# Patient Record
Sex: FEMALE | Race: WHITE | Employment: FULL TIME | RURAL
[De-identification: names, ages, dates, MRNs, and addresses within clinical notes are randomized per-mention and may not be internally consistent; named-entity substitution may affect disease eponyms.]

---

## 2017-03-02 ENCOUNTER — OFFICE VISIT (OUTPATIENT)
Dept: FAMILY MEDICINE CLINIC | Age: 22
End: 2017-03-02

## 2017-03-02 VITALS
HEART RATE: 91 BPM | DIASTOLIC BLOOD PRESSURE: 73 MMHG | BODY MASS INDEX: 22.16 KG/M2 | HEIGHT: 65 IN | TEMPERATURE: 97.6 F | OXYGEN SATURATION: 98 % | WEIGHT: 133 LBS | SYSTOLIC BLOOD PRESSURE: 116 MMHG | RESPIRATION RATE: 16 BRPM

## 2017-03-02 DIAGNOSIS — J30.9 ALLERGIC RHINITIS, CAUSE UNSPECIFIED: Primary | ICD-10-CM

## 2017-03-02 RX ORDER — CETIRIZINE HCL 10 MG
10 TABLET ORAL DAILY
Qty: 30 TAB | Refills: 3 | Status: SHIPPED | OUTPATIENT
Start: 2017-03-02 | End: 2017-09-22 | Stop reason: SDUPTHER

## 2017-03-02 RX ORDER — FLUTICASONE PROPIONATE 50 MCG
2 SPRAY, SUSPENSION (ML) NASAL DAILY
Qty: 1 BOTTLE | Refills: 3 | Status: SHIPPED | OUTPATIENT
Start: 2017-03-02 | End: 2017-10-27 | Stop reason: SDUPTHER

## 2017-03-02 NOTE — MR AVS SNAPSHOT
Visit Information Date & Time Provider Department Dept. Phone Encounter #  
 3/2/2017  3:30 PM Qing Sáurez, [facility] [phone] [id number] Upcoming Health Maintenance Date Due  
 HPV AGE 9Y-34Y (1 of 3 - Female 3 Dose Series) 7/22/2004 PAP AKA CERVICAL CYTOLOGY 7/22/2014 INFLUENZA AGE 9 TO ADULT 8/1/2016 DTaP/Tdap/Td series (7 - Td) 8/5/2023 Allergies as of 3/2/2017  Review Complete On: 3/2/2017 By: Yuliya Tidwell LPN Severity Noted Reaction Type Reactions Ceclor [Cefaclor]  05/05/2010    Other (comments)  
 uticaria Cephalosporins  01/20/2006    Other (comments)  
 swollen joints (as a baby) Current Immunizations  Reviewed on 7/24/2013 Name Date DTaP 8/4/1998, 1/27/1995, 1/24/1994, 1995, 1993 Hep B Vaccine 1/27/1995, 5/31/1994, 4/25/1994 Hib 1/27/1995, 1/24/1994, 1995, 1993 Influenza Vaccine 11/8/2013, 12/7/2012 MMR 8/4/1998, 11/2/1994 Poliovirus vaccine 8/4/1998, 11/2/1994, 1993, 1993 TB Skin Test (PPD) Intradermal 6/28/2016, 6/17/2016, 5/19/2014, 7/24/2013, 7/15/2013 Td 8/16/2004 Tdap 8/5/2013 Not reviewed this visit You Were Diagnosed With   
  
 Codes Comments Allergic rhinitis, cause unspecified    -  Primary ICD-10-CM: J30.9 ICD-9-CM: 477.9 Vitals BP  
  
  
  
  
  
 116/73 (BP 1 Location: Left arm, BP Patient Position: Sitting) Vitals History BMI and BSA Data Body Mass Index Body Surface Area  
 22.13 kg/m 2 1.66 m 2 Preferred Pharmacy Pharmacy Name Phone [facility] [address] [place] [address] [phone] Your Updated Medication List  
  
   
This list is accurate as of: 3/2/17  3:51 PM.  Always use your most recent med list.  
  
  
  
  
 cetirizine 10 mg tablet Commonly known as:  ZYRTEC Take 1 Tab by mouth daily. fluticasone 50 mcg/actuation nasal spray Commonly known as:  Alric Eaves 2 Sprays by Both Nostrils route daily. Prescriptions Sent to Pharmacy Refills  
 cetirizine (ZYRTEC) 10 mg tablet 3 Sig: Take 1 Tab by mouth daily. Class: Normal  
 Pharmacy: [id number] [place] [address] Ph #: [phone] Route: Oral  
 fluticasone (FLONASE) 50 mcg/actuation nasal spray 3 Si Sprays by Both Nostrils route daily. Class: Normal  
 Pharmacy: [id number] [place] [address] Ph #: [phone] Route: Both Nostrils Patient Instructions Sinusitis: Care Instructions Your Care Instructions Sinusitis is an infection of the lining of the sinus cavities in your head. Sinusitis often follows a cold. It causes pain and pressure in your head and face. In most cases, sinusitis gets better on its own in 1 to 2 weeks. But some mild symptoms may last for several weeks. Sometimes antibiotics are needed. Follow-up care is a key part of your treatment and safety. Be sure to make and go to all appointments, and call your doctor if you are having problems. It's also a good idea to know your test results and keep a list of the medicines you take. How can you care for yourself at home? · Take an over-the-counter pain medicine, such as acetaminophen (Tylenol), ibuprofen (Advil, Motrin), or naproxen (Aleve). Read and follow all instructions on the label. · If the doctor prescribed antibiotics, take them as directed. Do not stop taking them just because you feel better. You need to take the full course of antibiotics. · Be careful when taking over-the-counter cold or flu medicines and Tylenol at the same time. Many of these medicines have acetaminophen, which is Tylenol. Read the labels to make sure that you are not taking more than the recommended dose. Too much acetaminophen (Tylenol) can be harmful. · Breathe warm, moist air from a steamy shower, a hot bath, or a sink filled with hot water. Avoid cold, dry air. Using a humidifier in your home may help. Follow the directions for cleaning the machine. · Use saline (saltwater) nasal washes to help keep your nasal passages open and wash out mucus and bacteria. You can buy saline nose drops at a grocery store or drugstore. Or you can make your own at home by adding 1 teaspoon of salt and 1 teaspoon of baking soda to 2 cups of distilled water. If you make your own, fill a bulb syringe with the solution, insert the tip into your nostril, and squeeze gently. Verneice Root your nose. · Put a hot, wet towel or a warm gel pack on your face 3 or 4 times a day for 5 to 10 minutes each time. · Try a decongestant nasal spray like oxymetazoline (Afrin). Do not use it for more than 3 days in a row. Using it for more than 3 days can make your congestion worse. When should you call for help? Call your doctor now or seek immediate medical care if: 
· You have new or worse swelling or redness in your face or around your eyes. · You have a new or higher fever. Watch closely for changes in your health, and be sure to contact your doctor if: 
· You have new or worse facial pain. · The mucus from your nose becomes thicker (like pus) or has new blood in it. · You are not getting better as expected. Where can you learn more? Go to http://desmond-nazario.info/. Enter W586 in the search box to learn more about \"Sinusitis: Care Instructions. \" Current as of: July 29, 2016 Content Version: 11.1 © 6748-3449 [employer]. Care instructions adapted under license by [employer] (which disclaims liability or warranty for this information). If you have questions about a medical condition or this instruction, always ask your healthcare professional. [address] any warranty or liability for your use of this information. Introducing [facility]! Dear Capo Conde: 
Thank you for requesting a Tookitaki account. Our records indicate that you have previously registered for a Tookitaki account but its currently inactive. Please call our Tookitaki support line at [phone]. Additional Information If you have questions, please visit the Frequently Asked Questions section of the Tookitaki website at [email or web address] Remember, Tookitaki is NOT to be used for urgent needs. For medical emergencies, dial 911. Now available from your iPhone and Android! Please provide this summary of care documentation to your next provider. Your primary care clinician is listed as [address]. If you have any questions after today's visit, please call [phone].

## 2017-03-02 NOTE — PROGRESS NOTES
Blayne Valles is an 19 y.o. female who presents with chief concern of sore throat. Pharyngitis:   3 days of sore throat and \"losing voice\"  Green-yellow mucus, and productive sputum. No Fevers, no jaw-line pain  Eating and drinking without difficulty. No Smoking. Needs refill of zyrtec. Taking generic Nasonex. Detailed ROS below. Review of Systems, positives are bolded, strike through if denied. GEN:    CV:      Pulm:    Abd/GI:   Psych:    Neuro:     ENT:    Sore throat, sputum production, lost voice,   MSK:      Skin:    Lower Ext:           Current and past medical information:  I personally reviewed and included updated list below. History reviewed. No pertinent past medical history. Allergies   Allergen Reactions    Ceclor [Cefaclor] Other (comments)     uticaria    Cephalosporins Other (comments)     swollen joints (as a baby)       History reviewed. No pertinent surgical history.     Social History     Social History    Marital status: SINGLE     Spouse name: N/A    Number of children: N/A    Years of education: N/A     Social History Main Topics    Smoking status: Never Smoker    Smokeless tobacco: Never Used    Alcohol use No    Drug use: No    Sexual activity: Yes     Partners: Male     Birth control/ protection: Condom     Other Topics Concern    None     Social History Narrative           Physical Exam, Abnormal/pertinent findings bolded,     Visit Vitals    /73 (BP 1 Location: Left arm, BP Patient Position: Sitting)    Pulse 91    Temp 97.6 °F (36.4 °C) (Oral)    Resp 16    Ht 5' 5\" (1.651 m)    Wt 133 lb (60.3 kg)    SpO2 98%    BMI 22.13 kg/m2          GEN:    Alert and Oriented, No acute distress  Psych:  Mood appropriate, No pressured speech, linear thoughts  CV:    Regular Rhythm, S1 and S2 audible, no MRG, no palpable thrills  Pulm:    CTA B/L, no wheezes/rubs  GI/Abd:   Non-tender to palpation, normal bowel sounds x4, no masses  Neuro: Lucid, No focal deficits  ENT:    Mild hypertrophy of tonsillars bilaterally, Shotty LA without tender. EOMI, Non-icteric sclera, MMM  Neck:   Trachea midline, no lymphadenopathy  :    No suprapubic tenderness  MSK:  Normal gait, FROM in all four extremities  Skin:   No visible Rash, Ecchymosis, or Excoriations  Lower Ext: No edema, No tenderness to palpation, No palpable cords        Assessment/PLAN    1. Allergic rhinitis, cause unspecified  - Her symptoms have been present for 3 days and are not severe. She is young and healthy and likely has adequate reserve to fight the likely viral pharyngitis. She is concerned about the weekend. Advised aggressive hydration and nasal steroids concomitantly with her zyrtec. Around the clock IBprofun and tylenol would be beneficial as well. - cetirizine (ZYRTEC) 10 mg tablet; Take 1 Tab by mouth daily. Dispense: 30 Tab; Refill: 3  - fluticasone (FLONASE) 50 mcg/actuation nasal spray; 2 Sprays by Both Nostrils route daily. Dispense: 1 Bottle; Refill: 3      Follow-up Disposition: Not on File  For next visit follow up HPV vaccine status. She sees OB for paps and such but has not been HPV vaccinated and is interested. Plan of care:  Discussed diagnoses in detail with patient. Medication risks/benefits/side effects discussed with patient. All of the patient's questions were addressed. The patient understands and agrees with our plan of care. The patient knows to call back if they are unsure of or forget any changes we discussed today or if the symptoms change. The patient received an After-Visit Summary which contains VS, orders, medication list and allergy list. This can be used as a \"mini-medical record\" should they have to seek medical care while out of town. Valerio John DO  Resident note, PGY-3  Patient discussed with [facility] [address] Physician, Dr. Isha Mccurdy    No future appointments.         Current Medications after this visit[de-identified]       Current Outpatient Prescriptions   Medication Sig    cetirizine (ZYRTEC) 10 mg tablet Take 1 Tab by mouth daily.  fluticasone (FLONASE) 50 mcg/actuation nasal spray 2 Sprays by Both Nostrils route daily. No current facility-administered medications for this visit.

## 2017-03-02 NOTE — PATIENT INSTRUCTIONS
Sinusitis: Care Instructions  Your Care Instructions    Sinusitis is an infection of the lining of the sinus cavities in your head. Sinusitis often follows a cold. It causes pain and pressure in your head and face. In most cases, sinusitis gets better on its own in 1 to 2 weeks. But some mild symptoms may last for several weeks. Sometimes antibiotics are needed. Follow-up care is a key part of your treatment and safety. Be sure to make and go to all appointments, and call your doctor if you are having problems. It's also a good idea to know your test results and keep a list of the medicines you take. How can you care for yourself at home? · Take an over-the-counter pain medicine, such as acetaminophen (Tylenol), ibuprofen (Advil, Motrin), or naproxen (Aleve). Read and follow all instructions on the label. · If the doctor prescribed antibiotics, take them as directed. Do not stop taking them just because you feel better. You need to take the full course of antibiotics. · Be careful when taking over-the-counter cold or flu medicines and Tylenol at the same time. Many of these medicines have acetaminophen, which is Tylenol. Read the labels to make sure that you are not taking more than the recommended dose. Too much acetaminophen (Tylenol) can be harmful. · Breathe warm, moist air from a steamy shower, a hot bath, or a sink filled with hot water. Avoid cold, dry air. Using a humidifier in your home may help. Follow the directions for cleaning the machine. · Use saline (saltwater) nasal washes to help keep your nasal passages open and wash out mucus and bacteria. You can buy saline nose drops at a grocery store or drugstore. Or you can make your own at home by adding 1 teaspoon of salt and 1 teaspoon of baking soda to 2 cups of distilled water. If you make your own, fill a bulb syringe with the solution, insert the tip into your nostril, and squeeze gently. Vashti Phill your nose.   · Put a hot, wet towel or a warm gel pack on your face 3 or 4 times a day for 5 to 10 minutes each time. · Try a decongestant nasal spray like oxymetazoline (Afrin). Do not use it for more than 3 days in a row. Using it for more than 3 days can make your congestion worse. When should you call for help? Call your doctor now or seek immediate medical care if:  · You have new or worse swelling or redness in your face or around your eyes. · You have a new or higher fever. Watch closely for changes in your health, and be sure to contact your doctor if:  · You have new or worse facial pain. · The mucus from your nose becomes thicker (like pus) or has new blood in it. · You are not getting better as expected. Where can you learn more? Go to http://desmond-nazario.info/. Enter V727 in the search box to learn more about \"Sinusitis: Care Instructions. \"  Current as of: July 29, 2016  Content Version: 11.1  © [id number] [employer]. Care instructions adapted under license by [employer] (which disclaims liability or warranty for this information). If you have questions about a medical condition or this instruction, always ask your healthcare professional. [place] [address] any warranty or liability for your use of this information.

## 2017-03-02 NOTE — PROGRESS NOTES
Reviewed record in preparation for visit and have obtained necessary documentation. Patient did not bring medications to visit for review. Information provided on Advanced Directive, Living Will. Body mass index is 22.13 kg/(m^2).    Health Maintenance Due   Topic Date Due    HPV AGE 9Y-34Y (1 of 3 - Female 3 Dose Series) 07/22/2004    PAP AKA CERVICAL CYTOLOGY  07/22/2014    INFLUENZA AGE 9 TO ADULT  08/01/2016

## 2017-05-08 ENCOUNTER — TELEPHONE (OUTPATIENT)
Dept: FAMILY MEDICINE CLINIC | Age: 22
End: 2017-05-08
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
## 2017-05-12 ENCOUNTER — OFFICE VISIT (OUTPATIENT)
Dept: FAMILY MEDICINE CLINIC | Age: 22
End: 2017-05-12

## 2017-05-12 VITALS
SYSTOLIC BLOOD PRESSURE: 116 MMHG | TEMPERATURE: 98 F | HEIGHT: 65 IN | DIASTOLIC BLOOD PRESSURE: 74 MMHG | OXYGEN SATURATION: 97 % | HEART RATE: 51 BPM | WEIGHT: 132 LBS | RESPIRATION RATE: 18 BRPM | BODY MASS INDEX: 21.99 KG/M2

## 2017-05-12 DIAGNOSIS — S01.01XA LACERATION OF SCALP WITHOUT COMPLICATION, INITIAL ENCOUNTER: Primary | ICD-10-CM

## 2017-05-12 DIAGNOSIS — Z48.02 REMOVAL OF STAPLES: ICD-10-CM

## 2017-05-12 NOTE — PROGRESS NOTES
Reviewed record in preparation for visit and have necessary documentation  Pt did not bring medication to office visit for review  Opportunity was given for questions  Goals that were addressed and/or need to be completed after this appointment include   Health Maintenance Due   Topic Date Due    HPV AGE 9Y-26Y (1 of 3 - Female 3 Dose Series) 07/22/2004    PAP AKA CERVICAL CYTOLOGY  07/22/2014
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
## 2017-09-22 ENCOUNTER — OFFICE VISIT (OUTPATIENT)
Dept: FAMILY MEDICINE CLINIC | Age: 22
End: 2017-09-22

## 2017-09-22 VITALS
DIASTOLIC BLOOD PRESSURE: 79 MMHG | HEIGHT: 65 IN | SYSTOLIC BLOOD PRESSURE: 113 MMHG | RESPIRATION RATE: 18 BRPM | HEART RATE: 78 BPM | BODY MASS INDEX: 22.33 KG/M2 | OXYGEN SATURATION: 100 % | TEMPERATURE: 98.2 F | WEIGHT: 134 LBS

## 2017-09-22 DIAGNOSIS — J30.1 SEASONAL ALLERGIC RHINITIS DUE TO POLLEN: ICD-10-CM

## 2017-09-22 DIAGNOSIS — Z23 ENCOUNTER FOR IMMUNIZATION: ICD-10-CM

## 2017-09-22 DIAGNOSIS — N92.0 MENORRHAGIA WITH REGULAR CYCLE: Primary | ICD-10-CM

## 2017-09-22 RX ORDER — CETIRIZINE HCL 10 MG
10 TABLET ORAL DAILY
Qty: 30 TAB | Refills: 3 | Status: SHIPPED | OUTPATIENT
Start: 2017-09-22 | End: 2018-09-24 | Stop reason: SDUPTHER

## 2017-09-22 NOTE — PATIENT INSTRUCTIONS
Influenza (Flu) Vaccine: Care Instructions  Your Care Instructions  Influenza (flu) is an infection in the lungs and breathing passages. It is caused by the influenza virus. There are different strains, or types, of the flu virus every year. The flu comes on quickly. It can cause a cough, stuffy nose, fever, chills, tiredness, and aches and pains. These symptoms may last up to 10 days. The flu can make you feel very sick, but most of the time it doesn't lead to other problems. But it can cause serious problems in people who are older or who have a long-term illness, such as heart disease or diabetes. You can help prevent the flu by getting a flu vaccine every year, as soon as it is available. You cannot get the flu from the vaccine. The vaccine prevents most cases of the flu. But even when the vaccine doesn't prevent the flu, it can make symptoms less severe and reduce the chance of problems from the flu. Sometimes, young children and people who have an immune system problem may have a slight fever or muscle aches or pains 6 to 12 hours after getting the shot. These symptoms usually last 1 or 2 days. Follow-up care is a key part of your treatment and safety. Be sure to make and go to all appointments, and call your doctor if you are having problems. It's also a good idea to know your test results and keep a list of the medicines you take. Who should get the flu vaccine? Everyone age 7 months or older should get a flu vaccine each year. It lowers the chance of getting and spreading the flu. The vaccine is very important for people who are at high risk for getting other health problems from the flu. This includes:  · Anyone 48years of age or older. · People who live in a long-term care center, such as a nursing home. · All children 6 months through 25years of age. · Adults and children 6 months and older who have long-term heart or lung problems, such as asthma.   · Adults and children 6 months and older who needed medical care or were in a hospital during the past year because of diabetes, chronic kidney disease, or a weak immune system (including HIV or AIDS). · Women who will be pregnant during the flu season. · People who have any condition that can make it hard to breathe or swallow (such as a brain injury or muscle disorders). · People who can give the flu to others who are at high risk for problems from the flu. This includes all health care workers and close contacts of people age 72 or older. Who should not get the flu vaccine? The person who gives the vaccine may tell you not to get it if you:  · Have a severe allergy to eggs or any part of the vaccine. · Have had a severe reaction to a flu vaccine in the past.  · Have had Guillain-Barré syndrome (GBS). · Are sick with a fever. (Get the vaccine when symptoms are gone.)  How can you care for yourself at home? · If you or your child has a sore arm or a slight fever after the shot, take an over-the-counter pain medicine, such as acetaminophen (Tylenol) or ibuprofen (Advil, Motrin). Read and follow all instructions on the label. Do not give aspirin to anyone younger than 20. It has been linked to Reye syndrome, a serious illness. · Do not take two or more pain medicines at the same time unless the doctor told you to. Many pain medicines have acetaminophen, which is Tylenol. Too much acetaminophen (Tylenol) can be harmful. When should you call for help? Call 911 anytime you think you may need emergency care. For example, call if after getting the flu vaccine:  · You have symptoms of a severe reaction to the flu vaccine. Symptoms of a severe reaction may include:  ¨ Severe difficulty breathing. ¨ Sudden raised, red areas (hives) all over your body. ¨ Severe lightheadedness. Call your doctor now or seek immediate medical care if after getting the flu vaccine:  · You think you are having a reaction to the flu vaccine, such as a new fever.   Watch closely for changes in your health, and be sure to contact your doctor if you have any problems. Where can you learn more? Go to http://desmond-nazario.info/. Enter M754 in the search box to learn more about \"Influenza (Flu) Vaccine: Care Instructions. \"  Current as of: August 1, 2016  Content Version: 11.3  © 0860-6603 "[employer]. Care instructions adapted under license by [employer] (which disclaims liability or warranty for this information). If you have questions about a medical condition or this instruction, always ask your healthcare professional. [place] [address] any warranty or liability for your use of this information. Heavy Menstrual Periods: Care Instructions  Your Care Instructions    Many women get heavy menstrual periods and painful cramps. For some women, this means passing large blood clots and changing sanitary pads or tampons often. You may also have periods that last longer than 7 days. A change in hormones or an irritation in the uterus can cause heavy bleeding. Women who are overweight are more likely to have heavy menstrual periods. But there may not be a specific cause for your heavy menstrual periods. Your doctor may recommend hormone treatments to slow or stop your periods. If a fibroid (a growth that is not cancer) is causing your heavy bleeding, your doctor may recommend surgery or other treatments to remove the growth. Because blood loss from heavy menstrual periods can make you very tired and weak (anemic), your doctor may recommend that you take extra iron. Follow-up care is a key part of your treatment and safety. Be sure to make and go to all appointments, and call your doctor if you are having problems. It's also a good idea to know your test results and keep a list of the medicines you take. How can you care for yourself at home? · Get plenty of rest.  · Keep a record of your periods.  Write down when your period begins and ends and how much flow you have. That means counting the number of pads and tampons you use. Note whether they are soaked. Note any other symptoms. Take this record to your doctor appointments. · Take your medicines exactly as prescribed. Call your doctor if you think you are having a problem with your medicine. · Take pain medicines exactly as directed. ¨ If the doctor gave you a prescription medicine for pain, take it as prescribed. ¨ If you are not taking a prescription pain medicine, ask your doctor if you can take an over-the-counter medicine. · Try to reach a healthy weight. If you are trying to lose weight, do it slowly with your doctor's advice. · If you are taking iron pills:  ¨ Try to take the pills about 1 hour before or 2 hours after meals. But you may need to take iron with some food to avoid an upset stomach. ¨ Vitamin C (from food or pills) helps your body absorb iron. Try taking iron pills with a glass of orange juice or other citrus fruit juice. ¨ Do not take antacids or drink milk or caffeine drinks (such as coffee, tea, or cola) at the same time or within 2 hours of the time that you take your iron. They can make it hard for your body to absorb the iron. ¨ Iron pills may cause stomach problems, such as heartburn, nausea, diarrhea, constipation, and cramps. Be sure to drink plenty of fluids, and include fruits, vegetables, and fiber in your diet each day. ¨ If you forget to take an iron pill, do not take a double dose of iron the next time you take a pill. ¨ Keep iron pills out of the reach of small children. An overdose of iron can be very dangerous. When should you call for help? Call 911 anytime you think you may need emergency care. For example, call if:  · You passed out (lost consciousness). · You have sudden, severe pain in your belly or pelvis. Call your doctor now or seek immediate medical care if:  · You have severe vaginal bleeding.  This means that you are soaking through your usual pads or tampons each hour for 2 or more hours. · You are dizzy or lightheaded, or you feel like you may faint. · You have belly or pelvic pain when not menstruating. · You have a fever. · You have vaginal discharge with a bad odor. Watch closely for changes in your health, and be sure to contact your doctor if:  · Your heavy periods are disrupting your life. · You have vaginal bleeding when you do not expect it or after menopause. · You do not get better as expected. Where can you learn more? Go to http://desmond-nazario.info/. Enter F477 in the search box to learn more about \"Heavy Menstrual Periods: Care Instructions. \"  Current as of: October 13, 2016  Content Version: 11.3  © 0835-0622 [employer]. Care instructions adapted under license by [employer] (which disclaims liability or warranty for this information). If you have questions about a medical condition or this instruction, always ask your healthcare professional. [place] [address] any warranty or liability for your use of this information.

## 2017-09-22 NOTE — PROGRESS NOTES
Progress Note    Patient: Leatha Drew MRN: [id number]  SSN: [id number]    YOB: 1995  Age: 23 y.o. Sex: female        Chief Complaint   Patient presents with    Irregular Menses     lightheaded, dizziness during menstrual cycle, heavy flow         Subjective:     Encounter Diagnoses   Name Primary?  Menorrhagia with regular cycle Yes    Seasonal allergic rhinitis due to pollen     Encounter for immunization        Irregular Menses  Patient accompanied by mother and reports that she started her menses and that the flow was heavy (changing pad and tampon approx. every hour for the first 2 days of her period). This was accompanied by feeling of weakness and lightheadedness. She is concerned that school stress and not eating well is contributing. Reports similar Today reports that symptoms was improving. Health Maintenance  Health Maintenance Due   Topic Date Due    HPV AGE 9Y-34Y (1 of 3 - Female 3 Dose Series) 07/22/2004    PAP AKA CERVICAL CYTOLOGY  07/22/2014    INFLUENZA AGE 9 TO ADULT  08/01/2017       Current and past medical information:    Patient Active Problem List    Diagnosis Date Noted    Allergic dermatitis due ingested food 02/27/2015    Allergic rhinitis 05/05/2010    ADHD (attention deficit hyperactivity disorder) 05/05/2010    Dermatitis, atopic 05/05/2010       History reviewed. No pertinent past medical history. Allergies   Allergen Reactions    Ceclor [Cefaclor] Other (comments)     uticaria    Cephalosporins Other (comments)     swollen joints (as a baby)       History reviewed. No pertinent surgical history.     Social History     Social History    Marital status: SINGLE     Spouse name: N/A    Number of children: N/A    Years of education: N/A     Social History Main Topics    Smoking status: Never Smoker    Smokeless tobacco: Never Used    Alcohol use No    Drug use: No    Sexual activity: Yes     Partners: Male     Birth control/ protection: Condom     Other Topics Concern    None     Social History Narrative       {Review of Systems   Constitutional: Positive for malaise/fatigue. Negative for chills, diaphoresis, fever and weight loss. Cardiovascular: Negative for chest pain and palpitations. Gastrointestinal: Negative for abdominal pain, constipation, diarrhea, nausea and vomiting. Skin: Negative for itching and rash. Neurological: Positive for dizziness, weakness and headaches. Objective:     Vitals:    09/22/17 0759   BP: 113/79   Pulse: 78   Resp: 18   Temp: 98.2 °F (36.8 °C)   TempSrc: Oral   SpO2: 100%   Weight: 134 lb (60.8 kg)   Height: 5' 5\" (1.651 m)      Body mass index is 22.3 kg/(m^2). Physical Exam     Assessment and orders:     Encounter Diagnoses     ICD-10-CM ICD-9-CM   1. Menorrhagia with regular cycle N92.0 626.2   2. Seasonal allergic rhinitis due to pollen J30.1 477.0   3. Encounter for immunization Z23 V03.89       1. Menorrhagia with regular cycle  Symptoms correspond to menstrual cycle. With hx of the multiple heavy cycles, possible that she developed anemia. Will check and advised patient on dietary sources of honor.   - [id number]  - [facility] W/O DIFF    2. Seasonal allergic rhinitis due to pollen  - cetirizine (ZYRTEC) 10 mg tablet; Take 1 Tab by mouth daily. Dispense: 30 Tab; Refill: 3    3. Encounter for immunization  - INFLUENZA VIRUS VACCINE QUADRIVALENT, PRESERVATIVE FREE SYRINGE (97852)  - VT IMMUNIZ ADMIN,1 SINGLE/COMB VAC/TOXOID    Current medication list after this visit:  Current Outpatient Prescriptions   Medication Sig    cetirizine (ZYRTEC) 10 mg tablet Take 1 Tab by mouth daily.  fluticasone (FLONASE) 50 mcg/actuation nasal spray 2 Sprays by Both Nostrils route daily. No current facility-administered medications for this visit.         Medications Discontinued During This Encounter   Medication Reason    cetirizine (ZYRTEC) 10 mg tablet Reorder       Plan of care:  Discussed diagnoses in detail with patient. Medication risks/benefits/side effects discussed with patient. All of the patient's questions were addressed. The patient understands and agrees with our plan of care. The patient knows to call back if they are unsure of or forget any changes we discussed today or if the symptoms change. The patient received an After-Visit Summary which contains VS, orders, medication list and allergy list. This can be used as a \"mini-medical record\" should they have to seek medical care while out of town. Follow-up Disposition: Not on File    No future appointments.     Signed By: Karen Hernandez MD     September 22, 2017

## 2017-09-22 NOTE — PROGRESS NOTES
Reviewed record in preparation for visit and have necessary documentation  Opportunity was given for questions  Goals that were addressed and/or need to be completed after this appointment include   Health Maintenance Due   Topic Date Due    HPV AGE 9Y-26Y (1 of 3 - Female 3 Dose Series) 07/22/2004    PAP AKA CERVICAL CYTOLOGY  07/22/2014    INFLUENZA AGE 9 TO ADULT  08/01/2017

## 2017-09-22 NOTE — MR AVS SNAPSHOT
Visit Information Date & Time Provider Department Dept. Phone Encounter #  
 9/22/2017  8:00 AM Florecita Carpenter MD [address] [place] [address] [phone] [id number] Follow-up Instructions Return in about 3 months (around 12/22/2017), or if symptoms worsen or fail to improve, for Routine. Upcoming Health Maintenance Date Due  
 HPV AGE 9Y-34Y (1 of 3 - Female 3 Dose Series) 7/22/2004 PAP AKA CERVICAL CYTOLOGY 7/22/2014 INFLUENZA AGE 9 TO ADULT 8/1/2017 DTaP/Tdap/Td series (7 - Td) 8/5/2023 Allergies as of 9/22/2017  Review Complete On: 9/22/2017 By: Florecita Carpenter MD  
  
 Severity Noted Reaction Type Reactions Ceclor [Cefaclor]  05/05/2010    Other (comments)  
 uticaria Cephalosporins  01/20/2006    Other (comments)  
 swollen joints (as a baby) Current Immunizations  Reviewed on 5/24/2017 Name Date DTaP 8/4/1998, 1/27/1995, 1/24/1994, 1993, 1993 Hep B Vaccine 1/27/1995, 5/31/1994, 4/25/1994 Hib 1/27/1995, 1/24/1994, 1993, 1993 Influenza Vaccine 11/8/2013, 12/7/2012 Influenza Vaccine (Quad) PF  Incomplete MMR 8/4/1998, 11/2/1994 Poliovirus vaccine 8/4/1998, 11/2/1994, 1993, 1993 TB Skin Test (PPD) Intradermal 6/28/2016, 6/17/2016, 5/19/2014, 7/24/2013, 7/15/2013 Td 8/16/2004 Tdap 8/5/2013 Not reviewed this visit You Were Diagnosed With   
  
 Codes Comments Menorrhagia with regular cycle    -  Primary ICD-10-CM: N92.0 ICD-9-CM: 626.2 Seasonal allergic rhinitis due to pollen     ICD-10-CM: J30.1 ICD-9-CM: 477.0 Encounter for immunization     ICD-10-CM: N54 ICD-9-CM: V03.89 Vitals BP Pulse Temp Resp Height(growth percentile) Weight(growth percentile) 113/79 78 98.2 °F (36.8 °C) (Oral) 18 5' 5\" (1.651 m) 134 lb (60.8 kg) SpO2 BMI OB Status Smoking Status 100% 22.3 kg/m2 Having regular periods Never Smoker Vitals History BMI and BSA Data Body Mass Index Body Surface Area  
 22.3 kg/m 2 1.67 m 2 Preferred Pharmacy Pharmacy Name Phone [facility] [address] [place] [address] [phone] Your Updated Medication List  
  
   
This list is accurate as of: 9/22/17  8:19 AM.  Always use your most recent med list.  
  
  
  
  
 cetirizine 10 mg tablet Commonly known as:  ZYRTEC Take 1 Tab by mouth daily. fluticasone 50 mcg/actuation nasal spray Commonly known as:  Kenji Calk 2 Sprays by Both Nostrils route daily. Prescriptions Sent to Pharmacy Refills  
 cetirizine (ZYRTEC) 10 mg tablet 3 Sig: Take 1 Tab by mouth daily. Class: Normal  
 Pharmacy: [id number] #: [phone] Route: Oral  
  
We Performed the Following CBC W/O DIFF [16173 CPT(R)] FERRITIN [42036 CPT(R)] FOLATE [id number] CPT(R)] INFLUENZA VIRUS VAC QUAD,SPLIT,PRESV FREE SYRINGE IM [id number] CPT(R)] IRON PROFILE [id number] CPT(R)] NE IMMUNIZ ADMIN,1 SINGLE/COMB VAC/TOXOID [id number] CPT(R)] RETICULOCYTE COUNT [id number] CPT(R)] VITAMIN B12 V321710 CPT(R)] Follow-up Instructions Return in about 3 months (around 12/22/2017), or if symptoms worsen or fail to improve, for Routine. Patient Instructions Influenza (Flu) Vaccine: Care Instructions Your Care Instructions Influenza (flu) is an infection in the lungs and breathing passages. It is caused by the influenza virus. There are different strains, or types, of the flu virus every year. The flu comes on quickly. It can cause a cough, stuffy nose, fever, chills, tiredness, and aches and pains. These symptoms may last up to 10 days. The flu can make you feel very sick, but most of the time it doesn't lead to other problems.  But it can cause serious problems in people who are older or who have a long-term illness, such as heart disease or diabetes. You can help prevent the flu by getting a flu vaccine every year, as soon as it is available. You cannot get the flu from the vaccine. The vaccine prevents most cases of the flu. But even when the vaccine doesn't prevent the flu, it can make symptoms less severe and reduce the chance of problems from the flu. Sometimes, young children and people who have an immune system problem may have a slight fever or muscle aches or pains 6 to 12 hours after getting the shot. These symptoms usually last 1 or 2 days. Follow-up care is a key part of your treatment and safety. Be sure to make and go to all appointments, and call your doctor if you are having problems. It's also a good idea to know your test results and keep a list of the medicines you take. Who should get the flu vaccine? Everyone age 7 months or older should get a flu vaccine each year. It lowers the chance of getting and spreading the flu. The vaccine is very important for people who are at high risk for getting other health problems from the flu. This includes: · Anyone 48years of age or older. · People who live in a long-term care center, such as a nursing home. · All children 6 months through 25years of age. · Adults and children 6 months and older who have long-term heart or lung problems, such as asthma. · Adults and children 6 months and older who needed medical care or were in a hospital during the past year because of diabetes, chronic kidney disease, or a weak immune system (including HIV or AIDS). · Women who will be pregnant during the flu season. · People who have any condition that can make it hard to breathe or swallow (such as a brain injury or muscle disorders). · People who can give the flu to others who are at high risk for problems from the flu. This includes all health care workers and close contacts of people age 72 or older. Who should not get the flu vaccine? The person who gives the vaccine may tell you not to get it if you: 
· Have a severe allergy to eggs or any part of the vaccine. · Have had a severe reaction to a flu vaccine in the past. 
· Have had Guillain-Barré syndrome (GBS). · Are sick with a fever. (Get the vaccine when symptoms are gone.) How can you care for yourself at home? · If you or your child has a sore arm or a slight fever after the shot, take an over-the-counter pain medicine, such as acetaminophen (Tylenol) or ibuprofen (Advil, Motrin). Read and follow all instructions on the label. Do not give aspirin to anyone younger than 20. It has been linked to Reye syndrome, a serious illness. · Do not take two or more pain medicines at the same time unless the doctor told you to. Many pain medicines have acetaminophen, which is Tylenol. Too much acetaminophen (Tylenol) can be harmful. When should you call for help? Call 911 anytime you think you may need emergency care. For example, call if after getting the flu vaccine: 
· You have symptoms of a severe reaction to the flu vaccine. Symptoms of a severe reaction may include: ¨ Severe difficulty breathing. ¨ Sudden raised, red areas (hives) all over your body. ¨ Severe lightheadedness. Call your doctor now or seek immediate medical care if after getting the flu vaccine: 
· You think you are having a reaction to the flu vaccine, such as a new fever. Watch closely for changes in your health, and be sure to contact your doctor if you have any problems. Where can you learn more? Go to http://desmond-nazario.info/. Enter E756 in the search box to learn more about \"Influenza (Flu) Vaccine: Care Instructions. \" Current as of: August 1, 2016 Content Version: 11.3 © 6729-6349 [employer]. Care instructions adapted under license by [employer] (which disclaims liability or warranty for this information).  If you have questions about a medical condition or this instruction, always ask your healthcare professional. [address] any warranty or liability for your use of this information. Heavy Menstrual Periods: Care Instructions Your Care Instructions Many women get heavy menstrual periods and painful cramps. For some women, this means passing large blood clots and changing sanitary pads or tampons often. You may also have periods that last longer than 7 days. A change in hormones or an irritation in the uterus can cause heavy bleeding. Women who are overweight are more likely to have heavy menstrual periods. But there may not be a specific cause for your heavy menstrual periods. Your doctor may recommend hormone treatments to slow or stop your periods. If a fibroid (a growth that is not cancer) is causing your heavy bleeding, your doctor may recommend surgery or other treatments to remove the growth. Because blood loss from heavy menstrual periods can make you very tired and weak (anemic), your doctor may recommend that you take extra iron. Follow-up care is a key part of your treatment and safety. Be sure to make and go to all appointments, and call your doctor if you are having problems. It's also a good idea to know your test results and keep a list of the medicines you take. How can you care for yourself at home? · Get plenty of rest. 
· Keep a record of your periods. Write down when your period begins and ends and how much flow you have. That means counting the number of pads and tampons you use. Note whether they are soaked. Note any other symptoms. Take this record to your doctor appointments. · Take your medicines exactly as prescribed. Call your doctor if you think you are having a problem with your medicine. · Take pain medicines exactly as directed. ¨ If the doctor gave you a prescription medicine for pain, take it as prescribed.  
¨ If you are not taking a prescription pain medicine, ask your doctor if you can take an over-the-counter medicine. · Try to reach a healthy weight. If you are trying to lose weight, do it slowly with your doctor's advice. · If you are taking iron pills: ¨ Try to take the pills about 1 hour before or 2 hours after meals. But you may need to take iron with some food to avoid an upset stomach. ¨ Vitamin C (from food or pills) helps your body absorb iron. Try taking iron pills with a glass of orange juice or other citrus fruit juice. ¨ Do not take antacids or drink milk or caffeine drinks (such as coffee, tea, or cola) at the same time or within 2 hours of the time that you take your iron. They can make it hard for your body to absorb the iron. ¨ Iron pills may cause stomach problems, such as heartburn, nausea, diarrhea, constipation, and cramps. Be sure to drink plenty of fluids, and include fruits, vegetables, and fiber in your diet each day. ¨ If you forget to take an iron pill, do not take a double dose of iron the next time you take a pill. ¨ Keep iron pills out of the reach of small children. An overdose of iron can be very dangerous. When should you call for help? Call 911 anytime you think you may need emergency care. For example, call if: 
· You passed out (lost consciousness). · You have sudden, severe pain in your belly or pelvis. Call your doctor now or seek immediate medical care if: 
· You have severe vaginal bleeding. This means that you are soaking through your usual pads or tampons each hour for 2 or more hours. · You are dizzy or lightheaded, or you feel like you may faint. · You have belly or pelvic pain when not menstruating. · You have a fever. · You have vaginal discharge with a bad odor. Watch closely for changes in your health, and be sure to contact your doctor if: 
· Your heavy periods are disrupting your life. · You have vaginal bleeding when you do not expect it or after menopause. · You do not get better as expected. Where can you learn more? Go to http://desmond-nazario.info/. Enter F477 in the search box to learn more about \"Heavy Menstrual Periods: Care Instructions. \" Current as of: October 13, 2016 Content Version: 11.3 © 5987-2396 [employer]. Care instructions adapted under license by [employer] (which disclaims liability or warranty for this information). If you have questions about a medical condition or this instruction, always ask your healthcare professional. [address] any warranty or liability for your use of this information. Introducing [facility]! Dear Inocencia Terry: 
Thank you for requesting a TouristR account. Our records indicate that you have previously registered for a TouristR account but its currently inactive. Please call our TouristR support line at [phone]. Additional Information If you have questions, please visit the Frequently Asked Questions section of the TouristR website at [email or web address] Remember, TouristR is NOT to be used for urgent needs. For medical emergencies, dial 911. Now available from your iPhone and Android! Please provide this summary of care documentation to your next provider. Your primary care clinician is listed as [address]. If you have any questions after today's visit, please call [phone].

## 2017-09-22 NOTE — LETTER
NOTIFICATION RETURN TO WORK / SCHOOL 
 
9/22/2017 8:17 AM 
 
Ms. Hamzah Hernandez [address] [address] To Whom It May Concern: 
 
Hamzah Hernandez received an influenza vaccine on 9/22/2017 Lot # [id number] Expiration Date 4/30/2018 70 \[facility]\"" If there are questions or concerns please have the patient contact our office.  
 
 
 
Sincerely, 
 
 
Valery English MD

## 2017-09-23 LAB
ERYTHROCYTE [DISTWIDTH] IN BLOOD BY AUTOMATED COUNT: 15.9 % (ref 12.3–15.4)
FERRITIN SERPL-MCNC: 5 NG/ML (ref 15–150)
FOLATE SERPL-MCNC: 18.7 NG/ML
HCT VFR BLD AUTO: 39.4 % (ref 34–46.6)
HGB BLD-MCNC: 12.1 G/DL (ref 11.1–15.9)
IRON SATN MFR SERPL: 6 % (ref 15–55)
IRON SERPL-MCNC: 25 UG/DL (ref 27–159)
MCH RBC QN AUTO: 24.3 PG (ref 26.6–33)
MCHC RBC AUTO-ENTMCNC: 30.7 G/DL (ref 31.5–35.7)
MCV RBC AUTO: 79 FL (ref 79–97)
PLATELET # BLD AUTO: 299 X10E3/UL (ref 150–379)
RBC # BLD AUTO: 4.98 X10E6/UL (ref 3.77–5.28)
RETICS/RBC NFR AUTO: 0.6 % (ref 0.6–2.6)
TIBC SERPL-MCNC: 415 UG/DL (ref 250–450)
UIBC SERPL-MCNC: 390 UG/DL (ref 131–425)
VIT B12 SERPL-MCNC: 358 PG/ML (ref 211–946)
WBC # BLD AUTO: 6.3 X10E3/UL (ref 3.4–10.8)

## 2017-09-25 ENCOUNTER — TELEPHONE (OUTPATIENT)
Dept: FAMILY MEDICINE CLINIC | Age: 22
End: 2017-09-25

## 2017-09-25 DIAGNOSIS — E61.1 IRON DEFICIENCY: Primary | ICD-10-CM

## 2017-09-25 RX ORDER — FERROUS SULFATE 325(65) MG
325 TABLET, DELAYED RELEASE (ENTERIC COATED) ORAL 2 TIMES DAILY WITH MEALS
Qty: 60 TAB | Refills: 2 | Status: SHIPPED | OUTPATIENT
Start: 2017-09-25 | End: 2018-01-05 | Stop reason: SDUPTHER

## 2017-09-25 NOTE — TELEPHONE ENCOUNTER
Mom returned call. Informed her per results letter that the patient's iron level was low and that a prescription had been sent in. Also informed Mom that it was best taken with orange juice or other type of citrus and she needed to be rechecked in one month. Mom voiced understanding and will call back to schedule a follow-up appointment.

## 2017-09-25 NOTE — TELEPHONE ENCOUNTER
Patient called back. Informed her of her lab levels per result letter and scheduled her to come back in one month. Patient voiced understanding of instructions.

## 2017-09-25 NOTE — TELEPHONE ENCOUNTER
Héctor Jones, on HIPAA, called and requested that we call her back with the patient's test results as the patient is in class all day. Please call Jaden Bennett back at [phone].

## 2017-10-20 ENCOUNTER — TELEPHONE (OUTPATIENT)
Dept: FAMILY MEDICINE CLINIC | Age: 22
End: 2017-10-20

## 2017-10-20 DIAGNOSIS — D50.8 OTHER IRON DEFICIENCY ANEMIA: Primary | ICD-10-CM

## 2017-10-20 NOTE — TELEPHONE ENCOUNTER
Pt's mom called and states Pt has an appointment for Friday for her iron problem. She is still tired and sluggish. Her iron count was 5. Could you please put in labs for her on Monday so she will have the results when she comes in on Friday instead of her having to do the labs on Friday and have to wait til the following week. She still feels like she is about to pass out sometimes. Please call [phone] to confirm so she can make sure Pt is here on Monday.  Thanks

## 2017-10-20 NOTE — TELEPHONE ENCOUNTER
Labs have been ordered. The patient have come in them drawn prior to her visit.      Vijay Dubois MD

## 2017-10-21 LAB
ERYTHROCYTE [DISTWIDTH] IN BLOOD BY AUTOMATED COUNT: 17.2 % (ref 12.3–15.4)
FERRITIN SERPL-MCNC: 33 NG/ML (ref 15–150)
FOLATE SERPL-MCNC: 16.8 NG/ML
HCT VFR BLD AUTO: 39.3 % (ref 34–46.6)
HGB BLD-MCNC: 12.9 G/DL (ref 11.1–15.9)
IRON SATN MFR SERPL: 26 % (ref 15–55)
IRON SERPL-MCNC: 97 UG/DL (ref 27–159)
MCH RBC QN AUTO: 25.4 PG (ref 26.6–33)
MCHC RBC AUTO-ENTMCNC: 32.8 G/DL (ref 31.5–35.7)
MCV RBC AUTO: 77 FL (ref 79–97)
PLATELET # BLD AUTO: 272 X10E3/UL (ref 150–379)
RBC # BLD AUTO: 5.08 X10E6/UL (ref 3.77–5.28)
RETICS/RBC NFR AUTO: 0.9 % (ref 0.6–2.6)
TIBC SERPL-MCNC: 373 UG/DL (ref 250–450)
UIBC SERPL-MCNC: 276 UG/DL (ref 131–425)
VIT B12 SERPL-MCNC: 398 PG/ML (ref 211–946)
WBC # BLD AUTO: 6.5 X10E3/UL (ref 3.4–10.8)

## 2017-10-23 ENCOUNTER — TELEPHONE (OUTPATIENT)
Dept: FAMILY MEDICINE CLINIC | Age: 22
End: 2017-10-23

## 2017-10-23 NOTE — TELEPHONE ENCOUNTER
Lab results given to patient. Verbalized understanding. Patient will continue to take iron supplement.

## 2017-10-23 NOTE — TELEPHONE ENCOUNTER
Patient's most recent labs results are much improved. Her hemoglobin level is up to 12.9 and her iron is now 97 up from 25!     Romy Patton MD

## 2017-10-23 NOTE — TELEPHONE ENCOUNTER
Please call patient with lab results at [phone]. If her Mom asks for the results, it's ok to give them to her as well. Her Mom's name is Pao Broussard.

## 2017-10-27 ENCOUNTER — OFFICE VISIT (OUTPATIENT)
Dept: FAMILY MEDICINE CLINIC | Age: 22
End: 2017-10-27

## 2017-10-27 VITALS
SYSTOLIC BLOOD PRESSURE: 114 MMHG | HEIGHT: 65 IN | WEIGHT: 134 LBS | DIASTOLIC BLOOD PRESSURE: 80 MMHG | BODY MASS INDEX: 22.33 KG/M2 | OXYGEN SATURATION: 99 % | TEMPERATURE: 98.4 F | RESPIRATION RATE: 16 BRPM | HEART RATE: 84 BPM

## 2017-10-27 DIAGNOSIS — J30.1 ACUTE SEASONAL ALLERGIC RHINITIS DUE TO POLLEN: ICD-10-CM

## 2017-10-27 DIAGNOSIS — D50.8 IRON DEFICIENCY ANEMIA SECONDARY TO INADEQUATE DIETARY IRON INTAKE: Primary | ICD-10-CM

## 2017-10-27 RX ORDER — FLUTICASONE PROPIONATE 50 MCG
2 SPRAY, SUSPENSION (ML) NASAL DAILY
Qty: 1 BOTTLE | Refills: 3 | Status: SHIPPED | OUTPATIENT
Start: 2017-10-27 | End: 2018-12-18 | Stop reason: SDUPTHER

## 2017-10-27 NOTE — LETTER
10/27/2017 8:08 AM 
 
Ms. Lupe Holguin [address] [address] Dear Lupe Holguin: 
 
Please find your most recent results below. Resulted Orders CBC W/O DIFF Result Value Ref Range WBC 6.5 3.4 - 10.8 x10E3/uL  
 RBC 5.08 3.77 - 5.28 x10E6/uL HGB 12.9 11.1 - 15.9 g/dL HCT 39.3 34.0 - 46.6 % MCV 77 (L) 79 - 97 fL  
 MCH 25.4 (L) 26.6 - 33.0 pg  
 MCHC 32.8 31.5 - 35.7 g/dL  
 RDW 17.2 (H) 12.3 - 15.4 % PLATELET 756 886 - 435 x10E3/uL Narrative Performed at:  [address]  [id number] : Toni Parrish MD, Phone:  [phone] IRON PROFILE Result Value Ref Range TIBC 373 250 - 450 ug/dL UIBC 276 131 - 425 ug/dL Iron 97 27 - 159 ug/dL Iron % saturation 26 15 - 55 % Narrative Performed at:  [address]  [id number] : Toni Parrish MD, Phone:  [phone] FERRITIN Result Value Ref Range Ferritin 33 15 - 150 ng/mL Narrative Performed at:  [address]  [id number] : Toni Parrish MD, Phone:  [phone] FOLATE Result Value Ref Range Folate 16.8 >3.0 ng/mL Comment: A serum folate concentration of less than 3.1 ng/mL is 
considered to represent clinical deficiency. Narrative Performed at:  [address]  [id number] : Toni Parrish MD, Phone:  [phone] RETICULOCYTE COUNT Result Value Ref Range Reticulocyte count 0.9 0.6 - 2.6 % Narrative Performed at:  [address]  [id number] : Toni Parrish MD, Phone:  [phone] VITAMIN B12 Result Value Ref Range Vitamin B12 398 211 - 946 pg/mL Narrative Performed at:  [address]  [id number] : Ashanti Talbert MD, Phone:  [phone] RECOMMENDATIONS: 
None. Keep up the good work! Much improved. Please call me if you have any questions: [phone] Sincerely, 
 
 
Day Rodrigez MD

## 2017-10-27 NOTE — PROGRESS NOTES
Reviewed record in preparation for visit and have necessary documentation  Opportunity was given for questions  Goals that were addressed and/or need to be completed after this appointment include   Health Maintenance Due   Topic Date Due    HPV AGE 9Y-26Y (1 of 3 - Female 3 Dose Series) 07/22/2004    PAP AKA CERVICAL CYTOLOGY  07/22/2014

## 2017-10-27 NOTE — PROGRESS NOTES
Leno Zepeda  23 y.o. female  1995  [address]  [id number]     [id number]: Progress Note       Encounter Date: 10/27/2017    Chief Complaint   Patient presents with    Dizziness     occasionally    Other     copy of lab results     History of Present Illness   Leno Zepeda is a 23 y.o. female who presents to clinic today for:    Anemia secondary to iron deficiency. Patient reports that she is feeling much. However she is still having a occasional dizzy spell. She cannot relate these spells to a particular activity or exertion and episodes last less then 30 seconds. Denies any syncope, fall, LOC. No longer having dyspnea with mild physical activity. Tolerating iron well; no issue with constipation. Lab Results   Component Value Date/Time    Iron 97 10/20/2017 04:15 PM    TIBC 373 10/20/2017 04:15 PM    Iron % saturation 26 10/20/2017 04:15 PM    Ferritin 33 10/20/2017 04:15 PM       Health Maintenance  Will request from [facility]  Health Maintenance Due   Topic Date Due    HPV AGE 9Y-26Y (1 of 3 - Female 3 Dose Series) 07/22/2004    PAP AKA CERVICAL CYTOLOGY  07/22/2014     Review of Systems   Review of Systems   Constitutional: Negative for chills, diaphoresis, fever and malaise/fatigue. Cardiovascular: Negative for chest pain and palpitations. Gastrointestinal: Negative for blood in stool, constipation, melena, nausea and vomiting. Skin: Negative for itching and rash. Neurological: Positive for dizziness. Negative for tingling, seizures, loss of consciousness, weakness and headaches. Endo/Heme/Allergies: Does not bruise/bleed easily. Vitals/Objective:     Vitals:    10/27/17 0804   BP: 114/80   Pulse: 84   Resp: 16   Temp: 98.4 °F (36.9 °C)   TempSrc: Oral   SpO2: 99%   Weight: 134 lb (60.8 kg)   Height: 5' 5\" (1.651 m)     Body mass index is 22.3 kg/(m^2). Physical Exam   Constitutional: She appears well-developed and well-nourished. HENT:   Head: Normocephalic and atraumatic. Nose: Mucosal edema and rhinorrhea present. No nose lacerations, sinus tenderness or nasal deformity. No epistaxis. Right sinus exhibits no maxillary sinus tenderness and no frontal sinus tenderness. Left sinus exhibits no maxillary sinus tenderness and no frontal sinus tenderness. Mouth/Throat: No oropharyngeal exudate. Cardiovascular: Normal rate and regular rhythm. No murmur heard. Pulmonary/Chest: Effort normal and breath sounds normal. No respiratory distress. She has no wheezes. Musculoskeletal: Normal range of motion. She exhibits no edema or tenderness. Neurological: She is alert. Skin: Skin is warm and dry. No rash noted. No erythema. No pallor. No results found for this or any previous visit (from the past 24 hour(s)). Assessment and Plan:     Encounter Diagnoses     ICD-10-CM ICD-9-CM   1. Iron deficiency anemia secondary to inadequate dietary iron intake D50.8 280.1   2. Acute seasonal allergic rhinitis due to pollen J30.1 477.0       1. Iron deficiency anemia secondary to inadequate dietary iron intake  Improving. Advised patient to continue iron supplementation as she has only been on supplements for 1 month and the RBC turn over is closer to 3 months.   - IRON PROFILE; Future  - CBC WITH AUTOMATED DIFF; Future    2. Acute seasonal allergic rhinitis due to pollen  - fluticasone (FLONASE) 50 mcg/actuation nasal spray; 2 Sprays by Both Nostrils route daily. Dispense: 1 Bottle; Refill: 3    I have discussed the diagnosis with the patient and the intended plan as seen in the above orders. she has expressed understanding. The patient has received an after-visit summary and questions were answered concerning future plans. I have discussed medication side effects and warnings with the patient as well.     Electronically Signed: Manjit Caballero MD     History/Allergies   Patients past medical, surgical and family histories were reviewed and updated. History reviewed. No pertinent past medical history. History reviewed. No pertinent surgical history. No family history on file. Social History     Social History    Marital status: SINGLE     Spouse name: N/A    Number of children: N/A    Years of education: N/A     Occupational History    Not on file. Social History Main Topics    Smoking status: Never Smoker    Smokeless tobacco: Never Used    Alcohol use No    Drug use: No    Sexual activity: Yes     Partners: Male     Birth control/ protection: Condom     Other Topics Concern    Not on file     Social History Narrative         Allergies   Allergen Reactions    Ceclor [Cefaclor] Other (comments)     uticaria    Cephalosporins Other (comments)     swollen joints (as a baby)       Disposition     Follow-up Disposition:  Return in about 3 months (around 1/27/2018) for Lab work for anemia. and in 6 months for routine visit. .    Future Appointments  Date Time Provider Benjamin Mercado   1/26/2018 8:30 AM LAB [facility] DEL SCHED            Current Medications after this visit     Current Outpatient Prescriptions   Medication Sig    fluticasone (FLONASE) 50 mcg/actuation nasal spray 2 Sprays by Both Nostrils route daily.  ferrous sulfate (IRON) 325 mg (65 mg iron) EC tablet Take 1 Tab by mouth two (2) times daily (with meals).  cetirizine (ZYRTEC) 10 mg tablet Take 1 Tab by mouth daily. No current facility-administered medications for this visit.       Medications Discontinued During This Encounter   Medication Reason    fluticasone (FLONASE) 50 mcg/actuation nasal spray Reorder

## 2017-10-27 NOTE — PATIENT INSTRUCTIONS
Using a Nasal Steroid Spray: Care Instructions  Your Care Instructions    Your doctor may suggest using a corticosteroid nasal spray for your allergy symptoms or sinus problems. These sprays reduce the swelling inside the nose and sinuses. Unlike decongestant nasal sprays, steroid sprays won't lead to more swelling when you stop taking them. These sprays start working in a few days, but it may take several weeks before you get the full effect. Most side effects are minor. The most common complaint is a burning feeling in the nose right after the spray is used. Some people get nosebleeds. Follow-up care is a key part of your treatment and safety. Be sure to make and go to all appointments, and call your doctor if you are having problems. It's also a good idea to know your test results and keep a list of the medicines you take. How can you care for yourself at home? Here are some tips for using these sprays:  · You may need to prime the sprayer before you use it. This means spraying it into the air a few times to make sure you get the right amount of medicine. Follow the directions on the label. · Blow your nose before you spray. This will help clear out your nostrils. · Gently sniff the medicine into your nose as you spray. Don't snort, or the medicine will go all the way into your throat where it won't do much good. · Aim the nozzle straight toward the outer wall of your nostril. This will help keep the medicine from irritating the inner walls of your nose, especially your septum (the wall that separates your left and right nostrils). · Don't blow your nose for 10 minutes or so after you spray. And try not to sneeze. · Be safe with medicines. Use this medicine exactly as prescribed. Call your doctor if you think you are having a problem with your medicine. · Clean your sprayer once a week. Read the label to learn how. When should you call for help?   Watch closely for changes in your health, and be sure to contact your doctor if you have any problems. Where can you learn more? Go to http://desmond-nazario.info/. Enter Q500 in the search box to learn more about \"Using a Nasal Steroid Spray: Care Instructions. \"  Current as of: May 12, 2017  Content Version: 11.4  © 2082-2084 "[employer]ar[employer]". Care instructions adapted under license by "[employer]" (which disclaims liability or warranty for this information). If you have questions about a medical condition or this instruction, always ask your healthcare professional. [place] [address] any warranty or liability for your use of this information. Rhinitis: Care Instructions  Your Care Instructions  Rhinitis is swelling and irritation in the nose. Allergies and infections are often the cause. Your nose may run or feel stuffy. Other symptoms are itchy and sore eyes, ears, throat, and mouth. If allergies are the cause, your doctor may do tests to find out what you are allergic to. You may be able to stop symptoms if you avoid the things that cause them. Your doctor may suggest or prescribe medicine to ease your symptoms. Follow-up care is a key part of your treatment and safety. Be sure to make and go to all appointments, and call your doctor if you are having problems. It's also a good idea to know your test results and keep a list of the medicines you take. How can you care for yourself at home? · If your rhinitis is caused by allergies, try to find out what sets off (triggers) your symptoms. Take steps to avoid these triggers. ¨ Avoid yard work. It can stir up both pollen and mold. ¨ Do not smoke or allow others to smoke around you. If you need help quitting, talk to your doctor about stop-smoking programs and medicines. These can increase your chances of quitting for good. ¨ Do not use aerosol sprays, cleaning products, or perfumes.   ¨ If pollen is one of your triggers, close your house and car windows during blooming season. ¨ Clean your house often to control dust.  ¨ Keep pets outside. · If your doctor recommends over-the-counter medicines to relieve symptoms, take your medicines exactly as prescribed. Call your doctor if you think you are having a problem with your medicine. · Use saline (saltwater) nasal washes to help keep your nasal passages open and wash out mucus and bacteria. You can buy saline nose drops at a grocery store or drugstore. Or you can make your own at home by adding 1 teaspoon of salt and 1 teaspoon of baking soda to 2 cups of distilled water. If you make your own, fill a bulb syringe with the solution, insert the tip into your nostril, and squeeze gently. Melvia Creamer your nose. When should you call for help? Call your doctor now or seek immediate medical care if:  ? · You are having trouble breathing. ? Watch closely for changes in your health, and be sure to contact your doctor if:  ? · Mucus from your nose gets thicker (like pus) or has new blood in it. ? · You have new or worse symptoms. ? · You do not get better as expected. Where can you learn more? Go to http://desmond-nazario.info/. Enter M030 in the search box to learn more about \"Rhinitis: Care Instructions. \"  Current as of: May 12, 2017  Content Version: 11.4  © 4861-6966 [employer]. Care instructions adapted under license by [employer] (which disclaims liability or warranty for this information). If you have questions about a medical condition or this instruction, always ask your healthcare professional. [place] [address] any warranty or liability for your use of this information. Iron-Rich Diet: Care Instructions  Your Care Instructions    Your body needs iron to make hemoglobin. Hemoglobin is a substance in red blood cells that carries oxygen from the lungs to cells all through your body.  If you do not get enough iron, your body makes fewer and smaller red blood cells. As a result, your body's cells may not get enough oxygen. Adult men need 8 milligrams of iron a day; adult women need 18 milligrams of iron a day. After menopause, women need 8 milligrams of iron a day. A pregnant woman needs 27 milligrams of iron a day. Infants and young children have higher iron needs relative to their size than other age groups. People who have lost blood because of ulcers or heavy menstrual periods may become very low in iron and may develop anemia. Most people can get the iron their bodies need by eating enough of certain iron-rich foods. Your doctor may recommend that you take an iron supplement along with eating an iron-rich diet. Follow-up care is a key part of your treatment and safety. Be sure to make and go to all appointments, and call your doctor if you are having problems. It's also a good idea to know your test results and keep a list of the medicines you take. How can you care for yourself at home? · Make iron-rich foods a part of your daily diet. Iron-rich foods include:  ¨ All meats, such as chicken, beef, lamb, pork, fish, and shellfish. Liver is especially high in iron. ¨ Leafy green vegetables. ¨ Raisins, peas, beans, lentils, barley, and eggs. ¨ Iron-fortified breakfast cereals. · Eat foods with vitamin C along with iron-rich foods. Vitamin C helps you absorb more iron from food. Drink a glass of orange juice or another citrus juice with your food. · Eat meat and vegetables or grains together. The iron in meat helps your body absorb the iron in other foods. Where can you learn more? Go to http://desmond-nazario.info/. Enter [id number] in the search box to learn more about \"Iron-Rich Diet: Care Instructions. \"  Current as of: May 12, 2017  Content Version: 11.4  © 5173-0271 [employer]. Care instructions adapted under license by [employer] (which disclaims liability or warranty for this information).  If you have questions about a medical condition or this instruction, always ask your healthcare professional. [place] [address] any warranty or liability for your use of this information.

## 2017-10-27 NOTE — MR AVS SNAPSHOT
Visit Information Date & Time Provider Department Dept. Phone Encounter #  
 10/27/2017  8:00 AM Chary Trent MD [address] [place] [address] [phone] [id number] Follow-up Instructions Return in about 3 months (around 1/27/2018) for Lab work for anemia. and in 6 months for routine visit. Ez Odell Upcoming Health Maintenance Date Due  
 HPV AGE 9Y-34Y (1 of 3 - Female 3 Dose Series) 7/22/2004 PAP AKA CERVICAL CYTOLOGY 7/22/2014 DTaP/Tdap/Td series (7 - Td) 8/5/2023 Allergies as of 10/27/2017  Review Complete On: 10/27/2017 By: Chary Trent MD  
  
 Severity Noted Reaction Type Reactions Ceclor [Cefaclor]  05/05/2010    Other (comments)  
 uticaria Cephalosporins  01/20/2006    Other (comments)  
 swollen joints (as a baby) Current Immunizations  Reviewed on 5/24/2017 Name Date DTaP 8/4/1998, 1/27/1995, 1/24/1994, 1993, 1993 Hep B Vaccine 1/27/1995, 5/31/1994, 4/25/1994 Hib 1/27/1995, 1/24/1994, 1993, 1993 Influenza Vaccine 11/8/2013, 12/7/2012 Influenza Vaccine (Quad) PF 9/22/2017 MMR 8/4/1998, 11/2/1994 Poliovirus vaccine 8/4/1998, 11/2/1994, 1993, 1993 TB Skin Test (PPD) Intradermal 6/28/2016, 6/17/2016, 5/19/2014, 7/24/2013, 7/15/2013 Td 8/16/2004 Tdap 8/5/2013 Not reviewed this visit You Were Diagnosed With   
  
 Codes Comments Iron deficiency anemia secondary to inadequate dietary iron intake    -  Primary ICD-10-CM: D50.8 ICD-9-CM: 280.1 Acute seasonal allergic rhinitis due to pollen     ICD-10-CM: J30.1 ICD-9-CM: 477.0 Vitals BP Pulse Temp Resp Height(growth percentile) Weight(growth percentile) 114/80 84 98.4 °F (36.9 °C) (Oral) 16 5' 5\" (1.651 m) 134 lb (60.8 kg) SpO2 BMI OB Status Smoking Status 99% 22.3 kg/m2 Having regular periods Never Smoker Vitals History BMI and BSA Data Body Mass Index Body Surface Area  
 22.3 kg/m 2 1.67 m 2 Preferred Pharmacy Pharmacy Name Phone [facility] [address] [place] [address] [phone] Your Updated Medication List  
  
   
This list is accurate as of: 10/27/17  8:20 AM.  Always use your most recent med list.  
  
  
  
  
 cetirizine 10 mg tablet Commonly known as:  ZYRTEC Take 1 Tab by mouth daily. ferrous sulfate 325 mg (65 mg iron) EC tablet Commonly known as:  IRON Take 1 Tab by mouth two (2) times daily (with meals). fluticasone 50 mcg/actuation nasal spray Commonly known as:  Shelvia Birks 2 Sprays by Both Nostrils route daily. Prescriptions Sent to Pharmacy Refills  
 fluticasone (FLONASE) 50 mcg/actuation nasal spray 3 Si Sprays by Both Nostrils route daily. Class: Normal  
 Pharmacy: [address] [place] [address] Ph #: [phone] Route: Both Nostrils Follow-up Instructions Return in about 3 months (around 2018) for Lab work for anemia. and in 6 months for routine visit. Vonda Vaughn To-Do List   
 2018 Lab:  CBC WITH AUTOMATED DIFF   
  
 2018 Lab:  IRON PROFILE Patient Instructions Using a Nasal Steroid Spray: Care Instructions Your Care Instructions Your doctor may suggest using a corticosteroid nasal spray for your allergy symptoms or sinus problems. These sprays reduce the swelling inside the nose and sinuses. Unlike decongestant nasal sprays, steroid sprays won't lead to more swelling when you stop taking them. These sprays start working in a few days, but it may take several weeks before you get the full effect. Most side effects are minor. The most common complaint is a burning feeling in the nose right after the spray is used. Some people get nosebleeds. Follow-up care is a key part of your treatment and safety.  Be sure to make and go to all appointments, and call your doctor if you are having problems. It's also a good idea to know your test results and keep a list of the medicines you take. How can you care for yourself at home? Here are some tips for using these sprays: 
· You may need to prime the sprayer before you use it. This means spraying it into the air a few times to make sure you get the right amount of medicine. Follow the directions on the label. · Blow your nose before you spray. This will help clear out your nostrils. · Gently sniff the medicine into your nose as you spray. Don't snort, or the medicine will go all the way into your throat where it won't do much good. · Aim the nozzle straight toward the outer wall of your nostril. This will help keep the medicine from irritating the inner walls of your nose, especially your septum (the wall that separates your left and right nostrils). · Don't blow your nose for 10 minutes or so after you spray. And try not to sneeze. · Be safe with medicines. Use this medicine exactly as prescribed. Call your doctor if you think you are having a problem with your medicine. · Clean your sprayer once a week. Read the label to learn how. When should you call for help? Watch closely for changes in your health, and be sure to contact your doctor if you have any problems. Where can you learn more? Go to http://desmond-nazario.info/. Enter F181 in the search box to learn more about \"Using a Nasal Steroid Spray: Care Instructions. \" Current as of: May 12, 2017 Content Version: 11.4 © 9809-2219 Healthwise, Incorporated. Care instructions adapted under license by [employer] (which disclaims liability or warranty for this information). If you have questions about a medical condition or this instruction, always ask your healthcare professional. [address] any warranty or liability for your use of this information. Rhinitis: Care Instructions Your Care Instructions Rhinitis is swelling and irritation in the nose. Allergies and infections are often the cause. Your nose may run or feel stuffy. Other symptoms are itchy and sore eyes, ears, throat, and mouth. If allergies are the cause, your doctor may do tests to find out what you are allergic to. You may be able to stop symptoms if you avoid the things that cause them. Your doctor may suggest or prescribe medicine to ease your symptoms. Follow-up care is a key part of your treatment and safety. Be sure to make and go to all appointments, and call your doctor if you are having problems. It's also a good idea to know your test results and keep a list of the medicines you take. How can you care for yourself at home? · If your rhinitis is caused by allergies, try to find out what sets off (triggers) your symptoms. Take steps to avoid these triggers. ¨ Avoid yard work. It can stir up both pollen and mold. ¨ Do not smoke or allow others to smoke around you. If you need help quitting, talk to your doctor about stop-smoking programs and medicines. These can increase your chances of quitting for good. ¨ Do not use aerosol sprays, cleaning products, or perfumes. ¨ If pollen is one of your triggers, close your house and car windows during blooming season. ¨ Clean your house often to control dust. 
¨ Keep pets outside. · If your doctor recommends over-the-counter medicines to relieve symptoms, take your medicines exactly as prescribed. Call your doctor if you think you are having a problem with your medicine. · Use saline (saltwater) nasal washes to help keep your nasal passages open and wash out mucus and bacteria. You can buy saline nose drops at a grocery store or drugstore. Or you can make your own at home by adding 1 teaspoon of salt and 1 teaspoon of baking soda to 2 cups of distilled water.  If you make your own, fill a bulb syringe with the solution, insert the tip into your nostril, and squeeze gently. Verneice Root your nose. When should you call for help? Call your doctor now or seek immediate medical care if: 
? · You are having trouble breathing. ? Watch closely for changes in your health, and be sure to contact your doctor if: 
? · Mucus from your nose gets thicker (like pus) or has new blood in it. ? · You have new or worse symptoms. ? · You do not get better as expected. Where can you learn more? Go to http://desmond-nazario.info/. Enter M030 in the search box to learn more about \"Rhinitis: Care Instructions. \" Current as of: May 12, 2017 Content Version: 11.4 © 0510-4508 [employer]. Care instructions adapted under license by [employer] (which disclaims liability or warranty for this information). If you have questions about a medical condition or this instruction, always ask your healthcare professional. [place] [address] any warranty or liability for your use of this information. Iron-Rich Diet: Care Instructions Your Care Instructions Your body needs iron to make hemoglobin. Hemoglobin is a substance in red blood cells that carries oxygen from the lungs to cells all through your body. If you do not get enough iron, your body makes fewer and smaller red blood cells. As a result, your body's cells may not get enough oxygen. Adult men need 8 milligrams of iron a day; adult women need 18 milligrams of iron a day. After menopause, women need 8 milligrams of iron a day. A pregnant woman needs 27 milligrams of iron a day. Infants and young children have higher iron needs relative to their size than other age groups. People who have lost blood because of ulcers or heavy menstrual periods may become very low in iron and may develop anemia. Most people can get the iron their bodies need by eating enough of certain iron-rich foods.  Your doctor may recommend that you take an iron supplement along with eating an iron-rich diet. Follow-up care is a key part of your treatment and safety. Be sure to make and go to all appointments, and call your doctor if you are having problems. It's also a good idea to know your test results and keep a list of the medicines you take. How can you care for yourself at home? · Make iron-rich foods a part of your daily diet. Iron-rich foods include: ¨ All meats, such as chicken, beef, lamb, pork, fish, and shellfish. Liver is especially high in iron. ¨ Leafy green vegetables. ¨ Raisins, peas, beans, lentils, barley, and eggs. ¨ Iron-fortified breakfast cereals. · Eat foods with vitamin C along with iron-rich foods. Vitamin C helps you absorb more iron from food. Drink a glass of orange juice or another citrus juice with your food. · Eat meat and vegetables or grains together. The iron in meat helps your body absorb the iron in other foods. Where can you learn more? Go to http://desmond-nazario.info/. Enter [id number] in the search box to learn more about \"Iron-Rich Diet: Care Instructions. \" Current as of: May 12, 2017 Content Version: 11.4 © 8044-0108 Healthwise, Noah. Care instructions adapted under license by [employer] (which disclaims liability or warranty for this information). If you have questions about a medical condition or this instruction, always ask your healthcare professional. [place] [address] any warranty or liability for your use of this information. Introducing [facility]! Dear Brian Schulz: 
Thank you for requesting a Teleport account. Our records indicate that you have previously registered for a Teleport account but its currently inactive. Please call our Teleport support line at [phone]. Additional Information If you have questions, please visit the Frequently Asked Questions section of the Teleport website at [email or web address] Remember, MyChart is NOT to be used for urgent needs. For medical emergencies, dial 911. Now available from your iPhone and Android! Please provide this summary of care documentation to your next provider. Your primary care clinician is listed as [address]. If you have any questions after today's visit, please call [phone].

## 2017-11-07 ENCOUNTER — HOSPITAL ENCOUNTER (EMERGENCY)
Age: 22
Discharge: HOME | End: 2017-11-07
Attending: EMERGENCY MEDICINE
Payer: OTHER GOVERNMENT

## 2017-11-07 ENCOUNTER — TELEPHONE (OUTPATIENT)
Dept: FAMILY MEDICINE CLINIC | Age: 22
End: 2017-11-07

## 2017-11-07 VITALS
HEIGHT: 66 IN | HEART RATE: 63 BPM | BODY MASS INDEX: 21.38 KG/M2 | TEMPERATURE: 98.1 F | WEIGHT: 133 LBS | OXYGEN SATURATION: 100 % | RESPIRATION RATE: 16 BRPM | SYSTOLIC BLOOD PRESSURE: 104 MMHG | DIASTOLIC BLOOD PRESSURE: 63 MMHG

## 2017-11-07 DIAGNOSIS — R42 DIZZINESS: Primary | ICD-10-CM

## 2017-11-07 LAB
ALBUMIN SERPL-MCNC: 4.6 G/DL (ref 3.5–5)
ALBUMIN/GLOB SERPL: 1.3 {RATIO} (ref 1.1–2.2)
ALP SERPL-CCNC: 58 U/L (ref 45–117)
ALT SERPL-CCNC: 21 U/L (ref 12–78)
ANION GAP SERPL CALC-SCNC: 9 MMOL/L (ref 5–15)
APPEARANCE UR: CLEAR
AST SERPL-CCNC: 6 U/L (ref 15–37)
BACTERIA URNS QL MICRO: NEGATIVE /HPF
BASOPHILS # BLD: 0 K/UL (ref 0–0.1)
BASOPHILS NFR BLD: 0 % (ref 0–1)
BILIRUB SERPL-MCNC: 0.3 MG/DL (ref 0.2–1)
BILIRUB UR QL: NEGATIVE
BUN SERPL-MCNC: 13 MG/DL (ref 6–20)
BUN/CREAT SERPL: 17 (ref 12–20)
CALCIUM SERPL-MCNC: 9.3 MG/DL (ref 8.5–10.1)
CHLORIDE SERPL-SCNC: 103 MMOL/L (ref 97–108)
CO2 SERPL-SCNC: 28 MMOL/L (ref 21–32)
COLOR UR: NORMAL
CREAT SERPL-MCNC: 0.78 MG/DL (ref 0.55–1.02)
EOSINOPHIL # BLD: 0.1 K/UL (ref 0–0.4)
EOSINOPHIL NFR BLD: 1 % (ref 0–7)
EPITH CASTS URNS QL MICRO: NORMAL /LPF
ERYTHROCYTE [DISTWIDTH] IN BLOOD BY AUTOMATED COUNT: 16.4 % (ref 11.5–14.5)
GLOBULIN SER CALC-MCNC: 3.5 G/DL (ref 2–4)
GLUCOSE SERPL-MCNC: 96 MG/DL (ref 65–100)
GLUCOSE UR STRIP.AUTO-MCNC: NEGATIVE MG/DL
HCG UR QL: NEGATIVE
HCT VFR BLD AUTO: 45.1 % (ref 35–47)
HGB BLD-MCNC: 14.4 G/DL (ref 11.5–16)
HGB UR QL STRIP: NEGATIVE
HYALINE CASTS URNS QL MICRO: NORMAL /LPF (ref 0–5)
KETONES UR QL STRIP.AUTO: NEGATIVE MG/DL
LEUKOCYTE ESTERASE UR QL STRIP.AUTO: NEGATIVE
LIPASE SERPL-CCNC: 256 U/L (ref 73–393)
LYMPHOCYTES # BLD: 2.1 K/UL (ref 0.8–3.5)
LYMPHOCYTES NFR BLD: 30 % (ref 12–49)
MCH RBC QN AUTO: 26.8 PG (ref 26–34)
MCHC RBC AUTO-ENTMCNC: 31.9 G/DL (ref 30–36.5)
MCV RBC AUTO: 84 FL (ref 80–99)
MONOCYTES # BLD: 0.4 K/UL (ref 0–1)
MONOCYTES NFR BLD: 6 % (ref 5–13)
NEUTS SEG # BLD: 4.4 K/UL (ref 1.8–8)
NEUTS SEG NFR BLD: 63 % (ref 32–75)
NITRITE UR QL STRIP.AUTO: NEGATIVE
PH UR STRIP: 7.5 [PH] (ref 5–8)
PLATELET # BLD AUTO: 289 K/UL (ref 150–400)
POTASSIUM SERPL-SCNC: 3.7 MMOL/L (ref 3.5–5.1)
PROT SERPL-MCNC: 8.1 G/DL (ref 6.4–8.2)
PROT UR STRIP-MCNC: NEGATIVE MG/DL
RBC # BLD AUTO: 5.37 M/UL (ref 3.8–5.2)
RBC #/AREA URNS HPF: NORMAL /HPF (ref 0–5)
SODIUM SERPL-SCNC: 140 MMOL/L (ref 136–145)
SP GR UR REFRACTOMETRY: 1.01 (ref 1–1.03)
UA: UC IF INDICATED,UAUC: NORMAL
UROBILINOGEN UR QL STRIP.AUTO: 0.2 EU/DL (ref 0.2–1)
WBC # BLD AUTO: 7.1 K/UL (ref 3.6–11)
WBC URNS QL MICRO: NORMAL /HPF (ref 0–4)

## 2017-11-07 PROCEDURE — 81025 URINE PREGNANCY TEST: CPT

## 2017-11-07 PROCEDURE — 85025 COMPLETE CBC W/AUTO DIFF WBC: CPT | Performed by: EMERGENCY MEDICINE

## 2017-11-07 PROCEDURE — 80053 COMPREHEN METABOLIC PANEL: CPT | Performed by: EMERGENCY MEDICINE

## 2017-11-07 PROCEDURE — 81001 URINALYSIS AUTO W/SCOPE: CPT | Performed by: EMERGENCY MEDICINE

## 2017-11-07 PROCEDURE — 36415 COLL VENOUS BLD VENIPUNCTURE: CPT | Performed by: EMERGENCY MEDICINE

## 2017-11-07 PROCEDURE — 93005 ELECTROCARDIOGRAM TRACING: CPT

## 2017-11-07 PROCEDURE — 83690 ASSAY OF LIPASE: CPT | Performed by: EMERGENCY MEDICINE

## 2017-11-07 PROCEDURE — 99284 EMERGENCY DEPT VISIT MOD MDM: CPT

## 2017-11-07 NOTE — TELEPHONE ENCOUNTER
Phone call to patients mother. Dr. Jose Kinney does not have any openings this week.  Scheduled patient an appointment with Dr. Vitaly Mccauley on 11/10/17 at 8:20 AM.

## 2017-11-07 NOTE — DISCHARGE INSTRUCTIONS
Dizziness: Care Instructions  Your Care Instructions  Dizziness is the feeling of unsteadiness or fuzziness in your head. It is different than having vertigo, which is a feeling that the room is spinning or that you are moving or falling. It is also different from lightheadedness, which is the feeling that you are about to faint. It can be hard to know what causes dizziness. Some people feel dizzy when they have migraine headaches. Sometimes bouts of flu can make you feel dizzy. Some medical conditions, such as heart problems or high blood pressure, can make you feel dizzy. Many medicines can cause dizziness, including medicines for high blood pressure, pain, or anxiety. If a medicine causes your symptoms, your doctor may recommend that you stop or change the medicine. If it is a problem with your heart, you may need medicine to help your heart work better. If there is no clear reason for your symptoms, your doctor may suggest watching and waiting for a while to see if the dizziness goes away on its own. Follow-up care is a key part of your treatment and safety. Be sure to make and go to all appointments, and call your doctor if you are having problems. It's also a good idea to know your test results and keep a list of the medicines you take. How can you care for yourself at home? · If your doctor recommends or prescribes medicine, take it exactly as directed. Call your doctor if you think you are having a problem with your medicine. · Do not drive while you feel dizzy. · Try to prevent falls. Steps you can take include:  ¨ Using nonskid mats, adding grab bars near the tub, and using night-lights. ¨ Clearing your home so that walkways are free of anything you might trip on. ¨ Letting family and friends know that you have been feeling dizzy. This will help them know how to help you. When should you call for help? Call 911 anytime you think you may need emergency care.  For example, call if:  ? · You passed out (lost consciousness). ? · You have dizziness along with symptoms of a heart attack. These may include:  ¨ Chest pain or pressure, or a strange feeling in the chest.  ¨ Sweating. ¨ Shortness of breath. ¨ Nausea or vomiting. ¨ Pain, pressure, or a strange feeling in the back, neck, jaw, or upper belly or in one or both shoulders or arms. ¨ Lightheadedness or sudden weakness. ¨ A fast or irregular heartbeat. ? · You have symptoms of a stroke. These may include:  ¨ Sudden numbness, tingling, weakness, or loss of movement in your face, arm, or leg, especially on only one side of your body. ¨ Sudden vision changes. ¨ Sudden trouble speaking. ¨ Sudden confusion or trouble understanding simple statements. ¨ Sudden problems with walking or balance. ¨ A sudden, severe headache that is different from past headaches. ?Call your doctor now or seek immediate medical care if:  ? · You feel dizzy and have a fever, headache, or ringing in your ears. ? · You have new or increased nausea and vomiting. ? · Your dizziness does not go away or comes back. ? Watch closely for changes in your health, and be sure to contact your doctor if:  ? · You do not get better as expected. Where can you learn more? Go to http://desmond-nazario.info/. Enter U198 in the search box to learn more about \"Dizziness: Care Instructions. \"  Current as of: March 20, 2017  Content Version: 11.4  © 9290-3885 [employer]. Care instructions adapted under license by [employer] (which disclaims liability or warranty for this information). If you have questions about a medical condition or this instruction, always ask your healthcare professional. [place] [address] any warranty or liability for your use of this information. We hope that we have addressed all of your medical concerns.  The examination and treatment you received in the Emergency Department were for an emergent problem and were not intended as complete care. It is important that you follow up with your healthcare provider(s) for ongoing care. If your symptoms worsen or do not improve as expected, and you are unable to reach your usual health care provider(s), you should return to the Emergency Department. Today's healthcare is undergoing tremendous change, and patient satisfaction surveys are one of the many tools to assess the quality of medical care. You may receive a survey from the [employer] regarding your experience in the Emergency Department. I hope that your experience has been completely positive, particularly the medical care that I provided. As such, please participate in the survey; anything less than excellent does not meet my expectations or intentions. [facility] and [facility] participate in nationally recognized quality of care measures. If your blood pressure is greater than 120/80, as reported below, we urge that you seek medical care to address the potential of high blood pressure, commonly known as hypertension. Hypertension can be hereditary or can be caused by certain medical conditions, pain, stress, or \"white coat syndrome. \"       Please make an appointment with your health care provider(s) for follow up of your Emergency Department visit. VITALS:   Patient Vitals for the past 8 hrs:   Temp Pulse Resp BP SpO2   11/07/17 1819 - 63 - 104/63 -   11/07/17 1758 - 73 16 132/79 100 %   11/07/17 1637 - 80 16 127/74 95 %   11/07/17 1612 98.1 °F (36.7 °C) 74 20 110/64 99 %   11/07/17 1447 98.3 °F (36.8 °C) 77 14 (!) 128/91 100 %          Thank you for allowing us to provide you with medical care today. We realize that you have many choices for your emergency care needs. Please choose us in the future for any continued health care needs. Aubree Quan, [address] [place]. Office: [phone]            Recent Results (from the past 24 hour(s))   CBC WITH AUTOMATED DIFF    Collection Time: 11/07/17  4:35 PM   Result Value Ref Range    WBC 7.1 3.6 - 11.0 K/uL    RBC 5.37 (H) 3.80 - 5.20 M/uL    HGB 14.4 11.5 - 16.0 g/dL    HCT 45.1 35.0 - 47.0 %    MCV 84.0 80.0 - 99.0 FL    MCH 26.8 26.0 - 34.0 PG    MCHC 31.9 30.0 - 36.5 g/dL    RDW 16.4 (H) 11.5 - 14.5 %    PLATELET 846 319 - 960 K/uL    NEUTROPHILS 63 32 - 75 %    LYMPHOCYTES 30 12 - 49 %    MONOCYTES 6 5 - 13 %    EOSINOPHILS 1 0 - 7 %    BASOPHILS 0 0 - 1 %    ABS. NEUTROPHILS 4.4 1.8 - 8.0 K/UL    ABS. LYMPHOCYTES 2.1 0.8 - 3.5 K/UL    ABS. MONOCYTES 0.4 0.0 - 1.0 K/UL    ABS. EOSINOPHILS 0.1 0.0 - 0.4 K/UL    ABS. BASOPHILS 0.0 0.0 - 0.1 K/UL   METABOLIC PANEL, COMPREHENSIVE    Collection Time: 11/07/17  4:35 PM   Result Value Ref Range    Sodium 140 136 - 145 mmol/L    Potassium 3.7 3.5 - 5.1 mmol/L    Chloride 103 97 - 108 mmol/L    CO2 28 21 - 32 mmol/L    Anion gap 9 5 - 15 mmol/L    Glucose 96 65 - 100 mg/dL    BUN 13 6 - 20 MG/DL    Creatinine 0.78 0.55 - 1.02 MG/DL    BUN/Creatinine ratio 17 12 - 20      GFR est AA >60 >60 ml/min/1.73m2    GFR est non-AA >60 >60 ml/min/1.73m2    Calcium 9.3 8.5 - 10.1 MG/DL    Bilirubin, total 0.3 0.2 - 1.0 MG/DL    ALT (SGPT) 21 12 - 78 U/L    AST (SGOT) 6 (L) 15 - 37 U/L    Alk.  phosphatase 58 45 - 117 U/L    Protein, total 8.1 6.4 - 8.2 g/dL    Albumin 4.6 3.5 - 5.0 g/dL    Globulin 3.5 2.0 - 4.0 g/dL    A-G Ratio 1.3 1.1 - 2.2     LIPASE    Collection Time: 11/07/17  4:35 PM   Result Value Ref Range    Lipase 256 73 - 393 U/L   URINALYSIS W/ REFLEX CULTURE    Collection Time: 11/07/17  4:35 PM   Result Value Ref Range    Color YELLOW/STRAW      Appearance CLEAR CLEAR      Specific gravity 1.009 1.003 - 1.030      pH (UA) 7.5 5.0 - 8.0      Protein NEGATIVE  NEG mg/dL    Glucose NEGATIVE  NEG mg/dL    Ketone NEGATIVE  NEG mg/dL    Bilirubin NEGATIVE  NEG      Blood NEGATIVE  NEG Urobilinogen 0.2 0.2 - 1.0 EU/dL    Nitrites NEGATIVE  NEG      Leukocyte Esterase NEGATIVE  NEG      WBC 0-4 0 - 4 /hpf    RBC 0-5 0 - 5 /hpf    Epithelial cells FEW FEW /lpf    Bacteria NEGATIVE  NEG /hpf    UA:UC IF INDICATED CULTURE NOT INDICATED BY UA RESULT CNI      Hyaline cast 0-2 0 - 5 /lpf   HCG URINE, QL. - POC    Collection Time: 11/07/17  5:10 PM   Result Value Ref Range    Pregnancy test,urine (POC) NEGATIVE  NEG         No results found.

## 2017-11-07 NOTE — ED PROVIDER NOTES
HPI Comments: Chris Nguyen is a 23 y.o. female  who presents by private vehicle to ER with c/o Patient presents with:  Dizziness  Fatigue  Patient presents with feeling dizzy, lightheaded and weakness for the past month. Patient recently diagnosed with anemia and has been taking iron pills recently. Patient not feeling well today. Denies nausea, vomiting, diarrhea, fever or chills. Denies urinary symptoms. PAtient had head injury this summer was seen at [facility] and had negative head CT. She specifically denies any fevers, chills, nausea, vomiting, chest pain, shortness of breath, headache, rash, diarrhea, abdominal pain, urinary/bowel changes, sweating or weight loss. PCP: Louis Rodrigez MD   PMHx significant for: Past Medical History:  No date: Ill-defined condition      Comment: anemia   PSHx significant for: Past Surgical History:  No date: HX HEENT      Comment: teeth  Social Hx: Tobacco use: Smoking status: Never Smoker                                                              Smokeless status: Never Used                      ; EtOH use: The patient states she drinks 0 per week.; Illicit Drug use: Allergies:   -- Ceclor (Cefaclor) -- Other (comments)    --  uticaria   -- Cephalosporins -- Other (comments)    --  swollen joints (as a baby)    There are no other complaints, changes or physical findings at this time. Patient is a 23 y.o. female presenting with dizziness and fatigue. The history is provided by the patient. Dizziness   This is a new problem. The current episode started more than 1 week ago. The problem has not changed since onset. There was no focality noted. Pertinent negatives include no focal weakness, no loss of sensation, no loss of balance, no slurred speech, no speech difficulty, no memory loss, no movement disorder, no agitation, no visual change, no auditory change, no mental status change, no unresponsiveness and no disorientation. There has been no fever. Pertinent negatives include no shortness of breath, no chest pain, no vomiting, no altered mental status, no confusion, no headaches, no choking, no nausea, no bowel incontinence and no bladder incontinence. There were no medications administered prior to arrival. Associated medical issues do not include trauma, mood changes, bleeding disorder, seizures, dementia, CVA or clotting disorder. Fatigue   Pertinent negatives include no focal weakness, no loss of sensation, no loss of balance, no slurred speech, no speech difficulty, no memory loss, no movement disorder, no agitation, no visual change, no auditory change, no mental status change, no unresponsiveness and no disorientation. Pertinent negatives include no shortness of breath, no chest pain, no vomiting, no altered mental status, no confusion, no headaches, no choking, no nausea, no bowel incontinence and no bladder incontinence. Associated medical issues do not include trauma, mood changes, bleeding disorder, seizures, dementia, CVA or clotting disorder. Past Medical History:   Diagnosis Date    Ill-defined condition     anemia       Past Surgical History:   Procedure Laterality Date    HX HEENT      teeth         History reviewed. No pertinent family history. Social History     Social History    Marital status: SINGLE     Spouse name: N/A    Number of children: N/A    Years of education: N/A     Occupational History    Not on file. Social History Main Topics    Smoking status: Never Smoker    Smokeless tobacco: Never Used    Alcohol use No    Drug use: No    Sexual activity: Yes     Partners: Male     Birth control/ protection: Condom     Other Topics Concern    Not on file     Social History Narrative         ALLERGIES: Ceclor [cefaclor] and Cephalosporins    Review of Systems   Constitutional: Positive for fatigue. HENT: Negative. Eyes: Negative. Respiratory: Negative. Negative for choking and shortness of breath. Cardiovascular: Negative. Negative for chest pain. Gastrointestinal: Negative. Negative for bowel incontinence, nausea and vomiting. Endocrine: Negative. Genitourinary: Negative. Negative for bladder incontinence. Musculoskeletal: Negative. Skin: Negative. Allergic/Immunologic: Negative. Neurological: Positive for dizziness, weakness and light-headedness. Negative for focal weakness, speech difficulty, headaches and loss of balance. Hematological: Negative. Psychiatric/Behavioral: Negative. Negative for agitation, confusion and memory loss. All other systems reviewed and are negative. Vitals:    11/07/17 1447 11/07/17 1612 11/07/17 1637 11/07/17 1758   BP: (!) 128/91 110/64 127/74 132/79   Pulse: 77 74 80 73   Resp: 14 20 16 16   Temp: 98.3 °F (36.8 °C) 98.1 °F (36.7 °C)     SpO2: 100% 99% 95% 100%   Weight: 60.3 kg (133 lb)      Height: 5' 6\" (1.676 m)               Physical Exam   Constitutional: She is oriented to person, place, and time. She appears well-developed. HENT:   Head: Normocephalic and atraumatic. Right Ear: External ear normal.   Left Ear: External ear normal.   Nose: Nose normal.   Mouth/Throat: Oropharynx is clear and moist. No oropharyngeal exudate. Eyes: Conjunctivae, EOM and lids are normal. Right eye exhibits no discharge. Left eye exhibits no discharge. Neck: Normal range of motion. No tracheal deviation present. No thyromegaly present. Cardiovascular: Normal rate, regular rhythm, normal heart sounds and intact distal pulses. Pulmonary/Chest: Effort normal and breath sounds normal.   Abdominal: Soft. Normal appearance and bowel sounds are normal.   Musculoskeletal: Normal range of motion. Neurological: She is alert and oriented to person, place, and time. Skin: Skin is warm and dry. Psychiatric: She has a normal mood and affect.  Judgment normal.        MDM  Number of Diagnoses or Management Options  Dizziness:   Diagnosis management comments: Assesment/Plan- 23 y.o. Patient presents with:  Dizziness  Fatigue  differential includes: cardiac arrhythmia, dehydration, anemia, orthostatics, anxiety. Labs and imaging reviewed with no acute findings, new incomplete RBBB on ekg. Orthostatics normal. Recommend PCP and cardiology follow up. Patient educated on reasons to return to the ED. Amount and/or Complexity of Data Reviewed  Clinical lab tests: ordered and reviewed  Tests in the radiology section of CPT®: ordered and reviewed  Tests in the medicine section of CPT®: ordered and reviewed  Discuss the patient with other providers: yes (Attending- Dr. Brigida Ahuja who also saw patient and agrees with plan. )      ED Course       Procedures         ED EKG interpretation:  Rhythm: sinus bradycardia; and regular . Rate (approx.): 59; Axis: normal; P wave: normal; QRS interval: normal ; ST/T wave: normal; in  Lead: ; Other findings: incomplete right bundle branch block. This EKG was interpreted by SAMUEL Roger,ED Provider.

## 2017-11-07 NOTE — ED TRIAGE NOTES
Pt. C/o feeling dizzy, lightheaded and weak over the past month, recent dx anemia and taking iron pills now. Has not been feeling well today.   Denies any n/v.

## 2017-11-07 NOTE — TELEPHONE ENCOUNTER
Mom, Estevan Avery, called and stated that the patient is feeling bad again and wants to know if Dr. Rosa Ayala could see her. Diliavinicius Quinteroclarissa states that the last blood work came back good so she is concerned.

## 2017-11-08 ENCOUNTER — PATIENT OUTREACH (OUTPATIENT)
Dept: FAMILY MEDICINE CLINIC | Age: 22
End: 2017-11-08

## 2017-11-08 LAB
ATRIAL RATE: 59 BPM
CALCULATED P AXIS, ECG09: 41 DEGREES
CALCULATED R AXIS, ECG10: -54 DEGREES
CALCULATED T AXIS, ECG11: 61 DEGREES
DIAGNOSIS, 93000: NORMAL
P-R INTERVAL, ECG05: 118 MS
Q-T INTERVAL, ECG07: 430 MS
QRS DURATION, ECG06: 104 MS
QTC CALCULATION (BEZET), ECG08: 425 MS
VENTRICULAR RATE, ECG03: 59 BPM

## 2017-11-08 NOTE — PROGRESS NOTES
NNTOCED [address] [place] [address] 11/7/2017 Dizziness    TRANSITIONS OF CARE NOTE     Please note functional assessment. Patient stated \"I'm feeling better today. I'm off today, so I'm taking it easy\". Patient denies fever, chills, nausea, vomiting, chest pain, sob or inability to take medications. NN discussed red flags with patient who verbalized understanding of when to seek immediate medical attention. Red flags/sepsis: fever or below normal temperature (97f), chills, nausea, vomiting, diarrhea, chest pain, shortness of breath, unable to take medications, unusual sensations, and BFAST. Patient to call and schedule cardiology appointment today. Will schedule follow up with PCP after that. RRAT score: ED admission     Past Hospitalizations and/or ED visits last 12 months? 0    Advance Medical Directive on file in EMR? no     Barriers to care?  none    Support System consists of: family    [address] [place] [address], if so what agency? no    Medical History:     Past Medical History:   Diagnosis Date    Ill-defined condition     anemia        Called patient on 11/8/2017 and verified patient with 2 identifiers. Fall Risk Assessment:  No flowsheet data found. Patient presenting symptoms (referenced by SAMUEL Santana ): Patient presents with feeling dizzy, lightheaded and weakness for the past month. Patient recently diagnosed with anemia and has been taking iron pills recently. Patient not feeling well today. Denies nausea, vomiting, diarrhea, fever or chills. Denies urinary symptoms. PAtient had head injury this summer was seen at [address] [place] [address] and had negative head CT.      She specifically denies any fevers, chills, nausea, vomiting, chest pain, shortness of breath, headache, rash, diarrhea, abdominal pain, urinary/bowel changes, sweating or weight loss.     Course of current Hospitalization (referenced by SAMUEL Santana  note dated 11/7/2017):   MDM  Number of Diagnoses or Management Options  Dizziness:   Diagnosis management comments: Assesment/Plan- 23 y.o. Patient presents with:  Dizziness  Fatigue  differential includes: cardiac arrhythmia, dehydration, anemia, orthostatics, anxiety. Labs and imaging reviewed with no acute findings, new incomplete RBBB on ekg. Orthostatics normal. Recommend PCP and cardiology follow up. Patient educated on reasons to return to the ED.          Amount and/or Complexity of Data Reviewed  Clinical lab tests: ordered and reviewed  Tests in the radiology section of CPT®: ordered and reviewed  Tests in the medicine section of CPT®: ordered and reviewed  Discuss the patient with other providers: yes (Attending- Dr. Jackie Medel who also saw patient and agrees with plan.)     ED Course         Procedures            ED EKG interpretation:  Rhythm: sinus bradycardia; and regular . Rate (approx.): 59; Axis: normal; P wave: normal; QRS interval: normal ; ST/T wave: normal; in  Lead: ; Other findings: incomplete right bundle branch block. This EKG was interpreted by SAMUEL Holland,ED Provider.       Lab/Diagnostics at time of Discharge:   Results for Walker Hogan (MRN [id number]) as of 11/8/2017 11:47   Ref. Range 11/7/2017 16:35   WBC Latest Ref Range: 3.6 - 11.0 K/uL 7.1   RBC Latest Ref Range: 3.80 - 5.20 M/uL 5.37 (H)   HGB Latest Ref Range: 11.5 - 16.0 g/dL 14.4   HCT Latest Ref Range: 35.0 - 47.0 % 45.1   MCV Latest Ref Range: 80.0 - 99.0 FL 84.0   MCH Latest Ref Range: 26.0 - 34.0 PG 26.8   MCHC Latest Ref Range: 30.0 - 36.5 g/dL 31.9   RDW Latest Ref Range: 11.5 - 14.5 % 16.4 (H)   PLATELET Latest Ref Range: 150 - 400 K/uL 289   NEUTROPHILS Latest Ref Range: 32 - 75 % 63   LYMPHOCYTES Latest Ref Range: 12 - 49 % 30   MONOCYTES Latest Ref Range: 5 - 13 % 6   EOSINOPHILS Latest Ref Range: 0 - 7 % 1   BASOPHILS Latest Ref Range: 0 - 1 % 0   ABS. NEUTROPHILS Latest Ref Range: 1.8 - 8.0 K/UL 4.4   ABS. LYMPHOCYTES Latest Ref Range: 0.8 - 3.5 K/UL 2.1   ABS. MONOCYTES Latest Ref Range: 0.0 - 1.0 K/UL 0.4   ABS. EOSINOPHILS Latest Ref Range: 0.0 - 0.4 K/UL 0.1   ABS. BASOPHILS Latest Ref Range: 0.0 - 0.1 K/UL 0.0   Color Latest Units:   YELLOW/STRAW   Appearance Latest Ref Range: CLEAR   CLEAR   Specific gravity Latest Ref Range: 1.003 - 1.030   1.009   pH (UA) Latest Ref Range: 5.0 - 8.0   7.5   Protein Latest Ref Range: NEG mg/dL NEGATIVE   Glucose Latest Ref Range: NEG mg/dL NEGATIVE   Ketone Latest Ref Range: NEG mg/dL NEGATIVE   Blood Latest Ref Range: NEG   NEGATIVE   Bilirubin Latest Ref Range: NEG   NEGATIVE   Urobilinogen Latest Ref Range: 0.2 - 1.0 EU/dL 0.2   Nitrites Latest Ref Range: NEG   NEGATIVE   Leukocyte Esterase Latest Ref Range: NEG   NEGATIVE   Epithelial cells Latest Ref Range: FEW /lpf FEW   WBC Latest Ref Range: 0 - 4 /hpf 0-4   RBC Latest Ref Range: 0 - 5 /hpf 0-5   Bacteria Latest Ref Range: NEG /hpf NEGATIVE   Hyaline cast Latest Ref Range: 0 - 5 /lpf 0-2   Sodium Latest Ref Range: 136 - 145 mmol/L 140   Potassium Latest Ref Range: 3.5 - 5.1 mmol/L 3.7   Chloride Latest Ref Range: 97 - 108 mmol/L 103   CO2 Latest Ref Range: 21 - 32 mmol/L 28   Anion gap Latest Ref Range: 5 - 15 mmol/L 9   Glucose Latest Ref Range: 65 - 100 mg/dL 96   BUN Latest Ref Range: 6 - 20 MG/DL 13   Creatinine Latest Ref Range: 0.55 - 1.02 MG/DL 0.78   BUN/Creatinine ratio Latest Ref Range: 12 - 20   17   Calcium Latest Ref Range: 8.5 - 10.1 MG/DL 9.3   GFR est non-AA Latest Ref Range: >60 ml/min/1.73m2 >60   GFR est AA Latest Ref Range: >60 ml/min/1.73m2 >60   Bilirubin, total Latest Ref Range: 0.2 - 1.0 MG/DL 0.3   Protein, total Latest Ref Range: 6.4 - 8.2 g/dL 8.1   Albumin Latest Ref Range: 3.5 - 5.0 g/dL 4.6   Globulin Latest Ref Range: 2.0 - 4.0 g/dL 3.5   A-G Ratio Latest Ref Range: 1.1 - 2.2   1.3   ALT (SGPT) Latest Ref Range: 12 - 78 U/L 21   AST Latest Ref Range: 15 - 37 U/L 6 (L)   Alk.  phosphatase Latest Ref Range: 45 - 117 U/L 58   Lipase Latest Ref Range: 73 - 393 U/L 256       Medication Reconciliation completed: yes    Prescription Medication total: 3 (pharmacy consult for polypharm needed?) no     Patient Goals:  Goals      Attends follow-up appointments as directed. Patient to call and schedule cardiology appointment today. Will call back and schedule PCP appointment afterwards.  Prevent complications post hospitalization.  Understands red flags post discharge. Red flags/sepsis: fever or below normal temperature (97f), chills, nausea, vomiting, diarrhea, chest pain, shortness of breath, unable to take medications, unusual sensations, and BFAST. NN remains available to patient.

## 2017-11-21 ENCOUNTER — OFFICE VISIT (OUTPATIENT)
Dept: CARDIOLOGY CLINIC | Age: 22
End: 2017-11-21

## 2017-11-21 VITALS
HEIGHT: 66 IN | BODY MASS INDEX: 21.63 KG/M2 | HEART RATE: 72 BPM | DIASTOLIC BLOOD PRESSURE: 70 MMHG | WEIGHT: 134.6 LBS | SYSTOLIC BLOOD PRESSURE: 108 MMHG

## 2017-11-21 DIAGNOSIS — D50.0 IRON DEFICIENCY ANEMIA DUE TO CHRONIC BLOOD LOSS: ICD-10-CM

## 2017-11-21 DIAGNOSIS — R94.31 ABNORMAL EKG: Primary | ICD-10-CM

## 2017-11-21 DIAGNOSIS — R42 DIZZINESS: ICD-10-CM

## 2017-11-21 DIAGNOSIS — I45.10 RBBB: ICD-10-CM

## 2017-11-21 NOTE — MR AVS SNAPSHOT
Visit Information Date & Time Provider Department Dept. Phone Encounter #  
 11/21/2017  9:00 AM Kelsi Palacio MD [facility] [phone] [id number] Your Appointments 1/26/2018  8:30 AM  
LAB with LAB  [facility] ([address]) Appt Note: fasting labs [address] [address]  
[place] [address] [address] Upcoming Health Maintenance Date Due  
 HPV AGE 9Y-34Y (1 of 3 - Female 3 Dose Series) 7/22/2004 PAP AKA CERVICAL CYTOLOGY 7/22/2014 DTaP/Tdap/Td series (7 - Td) 8/5/2023 Allergies as of 11/21/2017  Review Complete On: 11/21/2017 By: Omar Velazquez LPN Severity Noted Reaction Type Reactions Ceclor [Cefaclor]  05/05/2010    Other (comments)  
 uticaria Cephalosporins  01/20/2006    Other (comments)  
 swollen joints (as a baby) Current Immunizations  Reviewed on 5/24/2017 Name Date DTaP 8/4/1998, 1/27/1995, 1/24/1994, 1993, 1993 Hep B Vaccine 1/27/1995, 5/31/1994, 4/25/1994 Hib 1/27/1995, 1/24/1994, 1993, 1993 Influenza Vaccine 11/8/2013, 12/7/2012 Influenza Vaccine (Quad) PF 9/22/2017 MMR 8/4/1998, 11/2/1994 Poliovirus vaccine 8/4/1998, 11/2/1994, 1993, 1993 TB Skin Test (PPD) Intradermal 6/28/2016, 6/17/2016, 5/19/2014, 7/24/2013, 7/15/2013 Td 8/16/2004 Tdap 8/5/2013 Not reviewed this visit You Were Diagnosed With   
  
 Codes Comments Abnormal EKG    -  Primary ICD-10-CM: R94.31 
ICD-9-CM: 794.31 Vitals BP Pulse Height(growth percentile) Weight(growth percentile) LMP BMI  
 108/70 72 5' 6\" (1.676 m) 134 lb 9.6 oz (61.1 kg) 10/31/2017 21.73 kg/m2 OB Status Smoking Status Having regular periods Never Smoker Vitals History BMI and BSA Data Body Mass Index Body Surface Area 21.73 kg/m 2 1.69 m 2 Preferred Pharmacy Pharmacy Name Phone [facility] [address] [facility] 79 [phone] Your Updated Medication List  
  
   
This list is accurate as of: 11/21/17  9:41 AM.  Always use your most recent med list.  
  
  
  
  
 cetirizine 10 mg tablet Commonly known as:  ZYRTEC Take 1 Tab by mouth daily. ferrous sulfate 325 mg (65 mg iron) EC tablet Commonly known as:  IRON Take 1 Tab by mouth two (2) times daily (with meals). fluticasone 50 mcg/actuation nasal spray Commonly known as:  Oneyda Fryer 2 Sprays by Both Nostrils route daily. We Performed the Following AMB POC EKG ROUTINE W/ 12 LEADS, INTER & REP [05469 CPT(R)] Patient Instructions Please schedule follow up with Dr. Sukh Linn in 3 months at the [facility]. For orthostatic blood pressure improvements drink more fluids and increase salt intake in the mornings, drink Gatorade, sit if dizzy. You will be scheduled for a 48 hour Holter monitor. Introducing [facility]! Dear Aileen Damico: 
Thank you for requesting a [employer] account. Our records indicate that you have previously registered for a [employer] account but its currently inactive. Please call our [employer] support line at [phone]. Additional Information If you have questions, please visit the Frequently Asked Questions section of the [employer] website at [email or web address] Remember, [employer] is NOT to be used for urgent needs. For medical emergencies, dial 911. Now available from your iPhone and Android! Please provide this summary of care documentation to your next provider. Your primary care clinician is listed as [address] 90. If you have any questions after today's visit, please call [phone].

## 2017-11-21 NOTE — PATIENT INSTRUCTIONS
Please schedule follow up with Dr. Jovi Steve in 3 months at the [facility] office. For orthostatic blood pressure improvements drink more fluids and increase salt intake in the mornings, drink Gatorade, sit if dizzy. You will be scheduled for a 48 hour Holter monitor.
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
## 2018-01-05 DIAGNOSIS — E61.1 IRON DEFICIENCY: ICD-10-CM

## 2018-01-05 RX ORDER — FERROUS SULFATE 325(65) MG
TABLET, DELAYED RELEASE (ENTERIC COATED) ORAL
Qty: 180 TAB | Refills: 2 | Status: SHIPPED | OUTPATIENT
Start: 2018-01-05 | End: 2019-03-25 | Stop reason: SDUPTHER

## 2018-01-29 DIAGNOSIS — D50.8 IRON DEFICIENCY ANEMIA SECONDARY TO INADEQUATE DIETARY IRON INTAKE: ICD-10-CM

## 2018-09-21 ENCOUNTER — OFFICE VISIT (OUTPATIENT)
Dept: FAMILY MEDICINE CLINIC | Age: 23
End: 2018-09-21

## 2018-09-21 VITALS
DIASTOLIC BLOOD PRESSURE: 86 MMHG | TEMPERATURE: 98.2 F | RESPIRATION RATE: 20 BRPM | OXYGEN SATURATION: 98 % | SYSTOLIC BLOOD PRESSURE: 117 MMHG | HEART RATE: 88 BPM | HEIGHT: 66 IN | WEIGHT: 136.8 LBS | BODY MASS INDEX: 21.98 KG/M2

## 2018-09-21 DIAGNOSIS — J00 ACUTE NASOPHARYNGITIS: Primary | ICD-10-CM

## 2018-09-21 RX ORDER — AZELASTINE 1 MG/ML
1 SPRAY, METERED NASAL 2 TIMES DAILY
Qty: 1 BOTTLE | Refills: 0 | Status: SHIPPED | OUTPATIENT
Start: 2018-09-21 | End: 2019-04-16

## 2018-09-21 RX ORDER — GUAIFENESIN 600 MG/1
600 TABLET, EXTENDED RELEASE ORAL 2 TIMES DAILY
COMMUNITY
Start: 2018-09-21

## 2018-09-21 RX ORDER — AZITHROMYCIN 250 MG/1
TABLET, FILM COATED ORAL
Qty: 6 TAB | Refills: 0 | Status: SHIPPED | OUTPATIENT
Start: 2018-09-24 | End: 2018-09-26

## 2018-09-21 NOTE — MR AVS SNAPSHOT
[address] [place] [address] 
[phone] Patient: Kelvin Gil MRN: [id number] :1995 Visit Information Date & Time Provider Department Dept. Phone Encounter #  
 2018  1:50 PM Danna Pierre MD  Swathi Mace [id number] Follow-up Instructions Return if symptoms worsen or fail to improve. Upcoming Health Maintenance Date Due  
 HPV Age 9Y-34Y (1 of 1 - Female 3 Dose Series) 2004 PAP AKA CERVICAL CYTOLOGY 2014 Influenza Age 5 to Adult 2018 DTaP/Tdap/Td series (7 - Td) 2023 Allergies as of 2018  Review Complete On: 2018 By: Danna Pierre MD  
  
 Severity Noted Reaction Type Reactions Ceclor [Cefaclor]  2010    Other (comments)  
 uticaria Cephalosporins  2006    Other (comments)  
 swollen joints (as a baby) Current Immunizations  Reviewed on 2017 Name Date DTaP 1998, 1995, 1994, 1993, 1993 Hep B Vaccine 1995, 1994, 1994 Hib 1995, 1994, 1993, 1993 Influenza Vaccine 2013, 2012 Influenza Vaccine (Quad) PF 2017 MMR 1998, 1994 Poliovirus vaccine 1998, 1994, 1993, 1993 TB Skin Test (PPD) Intradermal 2016, 2016, 2014, 2013, 7/15/2013 Td 2004 Tdap 2013 Not reviewed this visit You Were Diagnosed With   
  
 Codes Comments Acute nasopharyngitis    -  Primary ICD-10-CM: Nlelie Rajan ICD-9-CM: 606 Vitals BP Pulse Temp Resp Height(growth percentile) Weight(growth percentile) 117/86 (BP 1 Location: Left arm, BP Patient Position: Sitting) 88 98.2 °F (36.8 °C) (Oral) 20 5' 6\" (1.676 m) 136 lb 12.8 oz (62.1 kg) SpO2 BMI OB Status Smoking Status 98% 22.08 kg/m2 Having regular periods Never Smoker Vitals History BMI and BSA Data Body Mass Index Body Surface Area 22.08 kg/m 2 1.7 m 2 Preferred Pharmacy Pharmacy Name Phone [address] [place] [address] [phone] Your Updated Medication List  
  
   
This list is accurate as of 18  2:00 PM.  Always use your most recent med list.  
  
  
  
  
 azelastine 137 mcg (0.1 %) nasal spray Commonly known as:  ASTELIN  
1 Spray by Both Nostrils route two (2) times a day. Use in each nostril as directed  
  
 azithromycin 250 mg tablet Commonly known as:  Adrienne Abu Take 2 tablets today, then take 1 tablet daily Start taking on:  2018  
  
 cetirizine 10 mg tablet Commonly known as:  ZYRTEC Take 1 Tab by mouth daily. ferrous sulfate 325 mg (65 mg iron) EC tablet Commonly known as:  IRON  
TAKE ONE TABLET BY MOUTH TWICE DAILY WITH MEALS  
  
 fluticasone 50 mcg/actuation nasal spray Commonly known as:  Leroy Finely 2 Sprays by Both Nostrils route daily. guaiFENesin  mg ER tablet Commonly known as:  [place] [address] Take 1 Tab by mouth two (2) times a day. Prescriptions Printed Refills  
 azithromycin (ZITHROMAX) 250 mg tablet 0 Starting on: 2018 Sig: Take 2 tablets today, then take 1 tablet daily Class: Print Prescriptions Sent to Pharmacy Refills  
 azelastine (ASTELIN) 137 mcg (0.1 %) nasal spray 0 [place] by Both Nostrils route two (2) times a day. Use in each nostril as directed Class: Normal  
 Pharmacy: [facility] [address] EMELY HADDAD [address] [place] [address] Ph #: [phone] Route: Both Nostrils Follow-up Instructions Return if symptoms worsen or fail to improve. Patient Instructions Saline Nasal Washes: Care Instructions Your Care Instructions Saline nasal washes help keep the nasal passages open by washing out thick or dried mucus. This simple remedy can help relieve symptoms of allergies, sinusitis, and colds. It also can make the nose feel more comfortable by keeping the mucous membranes moist. You may notice a little burning sensation in your nose the first few times you use the solution, but this usually gets better in a few days. Follow-up care is a key part of your treatment and safety. Be sure to make and go to all appointments, and call your doctor if you are having problems. It's also a good idea to know your test results and keep a list of the medicines you take. How can you care for yourself at home? · You can buy premixed saline solution in a squeeze bottle or other sinus rinse products at a drugstore. Read and follow the instructions on the label. · You also can make your own saline solution by adding 1 teaspoon of salt and 1 teaspoon of baking soda to 2 cups of distilled water. · If you use a homemade solution, pour a small amount into a clean bowl. Using a rubber bulb syringe, squeeze the syringe and place the tip in the salt water. Pull a small amount of the salt water into the syringe by relaxing your hand. · Sit down with your head tilted slightly back. Do not lie down. Put the tip of the bulb syringe or the squeeze bottle a little way into one of your nostrils. Gently drip or squirt a few drops into the nostril. Repeat with the other nostril. Some sneezing and gagging are normal at first. 
· Gently blow your nose. · Wipe the syringe or bottle tip clean after each use. · Repeat this 2 or 3 times a day. · Use nasal washes gently if you have nosebleeds often. When should you call for help? Watch closely for changes in your health, and be sure to contact your doctor if: 
  · You often get nosebleeds.  
  · You have problems doing the nasal washes. Where can you learn more? Go to http://desmond-nazario.info/.  
Enter [phone] in the search box to learn more about \"Saline Nasal Washes: Care Instructions. \" Current as of: May 12, 2017 Content Version: 11.7 © 8382-9396 [employer]. Care instructions adapted under license by [employer] (which disclaims liability or warranty for this information). If you have questions about a medical condition or this instruction, always ask your healthcare professional. [address]  any warranty or liability for your use of this information. Introducing [facility]! [address] introduces ShotClip patient portal. Now you can access parts of your medical record, email your doctor's office, and request medication refills online. 1. In your internet browser, go to [email or web address] 2. Click on the First Time User? Click Here link in the Sign In box. You will see the New Member Sign Up page. 3. Enter your ShotClip Access Code exactly as it appears below. You will not need to use this code after youve completed the sign-up process. If you do not sign up before the expiration date, you must request a new code. · ShotClip Access Code: [email or web address] Expires: 12/20/2018  1:42 PM 
 
4. Enter the last four digits of your Social Security Number (xxxx) and Date of Birth (mm/dd/yyyy) as indicated and click Submit. You will be taken to the next sign-up page. 5. Create a ShotClip ID. This will be your ShotClip login ID and cannot be changed, so think of one that is secure and easy to remember. 6. Create a ShotClip password. You can change your password at any time. 7. Enter your Password Reset Question and Answer. This can be used at a later time if you forget your password. 8. Enter your e-mail address. You will receive e-mail notification when new information is available in [address] 9. Click Sign Up. You can now view and download portions of your medical record. 10. Click the Download Summary menu link to download a portable copy of your medical information. If you have questions, please visit the Frequently Asked Questions section of the [employer] website. Remember, [employer] is NOT to be used for urgent needs. For medical emergencies, dial 911. Now available from your iPhone and Android! Please provide this summary of care documentation to your next provider. Your primary care clinician is listed as Maru Tong. If you have any questions after today's visit, please call [phone].

## 2018-09-21 NOTE — PROGRESS NOTES
1. Have you been to the ER, urgent care clinic since your last visit? Hospitalized since your last visit? No    2. Have you seen or consulted any other health care providers outside of the [address] [place] [address] since your last visit? Include any pap smears or colon screening.  No  Reviewed record in preparation for visit and have necessary documentation  Pt did not bring medication to office visit for review  Goals that were addressed and/or need to be completed during or after this appointment include     Health Maintenance Due   Topic Date Due    HPV Age 9Y-34Y (1 of 3 - Female 3 Dose Series) 07/22/2004    PAP AKA CERVICAL CYTOLOGY  07/22/2014    Influenza Age 9 to Adult  08/01/2018

## 2018-09-21 NOTE — PROGRESS NOTES
Evelin Hopkins  20 y.o. female  1995  [address] [facility] [address]  [id number]     [id number]: Progress Note       Encounter Date: 9/21/2018    Chief Complaint   Patient presents with    Cold Symptoms       History provided by patient  History of Present Illness   Evelin Hopkins is a 20 y.o. female who presents to clinic today for:    Cold symptoms  Patient present with cc of cold symptoms x 4 days. Associated with cough with productive sputum, nasal congestion, hoarse voice. She had not been taking her zyrtec; did start taking robitussin which did help with symptoms. Review of Systems   Review of Systems   Constitutional: Negative for chills and fever. HENT: Positive for congestion and sore throat. Negative for ear discharge, ear pain and sinus pain. Respiratory: Positive for cough. Negative for shortness of breath, wheezing and stridor. Cardiovascular: Negative for chest pain and palpitations. Skin: Negative for itching and rash. Neurological: Negative for dizziness, seizures and headaches. Vitals/Objective:     Vitals:    09/21/18 1346   BP: 117/86   Pulse: 88   Resp: 20   Temp: 98.2 °F (36.8 °C)   TempSrc: Oral   SpO2: 98%   Weight: 136 lb 12.8 oz (62.1 kg)   Height: 5' 6\" (1.676 m)     Body mass index is 22.08 kg/(m^2). Wt Readings from Last 3 Encounters:   09/21/18 136 lb 12.8 oz (62.1 kg)   11/21/17 134 lb 9.6 oz (61.1 kg)   11/07/17 133 lb (60.3 kg)       Physical Exam   Constitutional: She is oriented to person, place, and time. She appears well-developed and well-nourished. HENT:   Head: Normocephalic and atraumatic. Right Ear: External ear normal.   Left Ear: External ear normal.   Mouth/Throat: Oropharynx is clear and moist. No oropharyngeal exudate. Eyes: Right eye exhibits no discharge. Left eye exhibits no discharge. Neck: Normal range of motion. Neck supple. Cardiovascular: Normal rate and regular rhythm.     No murmur heard.  Pulmonary/Chest: Effort normal and breath sounds normal. No respiratory distress. She has no wheezes. Musculoskeletal: Normal range of motion. Lymphadenopathy:     She has no cervical adenopathy. Neurological: She is alert and oriented to person, place, and time. No results found for this or any previous visit (from the past 24 hour(s)). Assessment and Plan:     Encounter Diagnoses     ICD-10-CM ICD-9-CM   1. Acute nasopharyngitis J00 460       1. Acute nasopharyngitis  Likely viral etiology. Advised supportive measures. Antibiotic prescription printed and dated for 9/24/2018 to be filled if no improvement,   - guaiFENesin ER (MUCINEX) 600 mg ER tablet; Take 1 Tab by mouth two (2) times a day. - azelastine (ASTELIN) 137 mcg (0.1 %) nasal spray; 1 [place] by Both Nostrils route two (2) times a day. Use in each nostril as directed  Dispense: 1 Bottle; Refill: 0  - azithromycin (ZITHROMAX) 250 mg tablet; Take 2 tablets today, then take 1 tablet daily  Dispense: 6 Tab; Refill: 0    I have discussed the diagnosis with the patient and the intended plan as seen in the above orders. she has expressed understanding. The patient has received an after-visit summary and questions were answered concerning future plans. I have discussed medication side effects and warnings with the patient as well. Electronically Signed: Gio Calderón MD     History/Allergies   Patients past medical, surgical and family histories were reviewed and updated. Past Medical History:   Diagnosis Date    Ill-defined condition     anemia      Past Surgical History:   Procedure Laterality Date    HX HEENT      teeth     No family history on file. Social History     Social History    Marital status: SINGLE     Spouse name: N/A    Number of children: N/A    Years of education: N/A     Occupational History    Not on file.      Social History Main Topics    Smoking status: Never Smoker    Smokeless tobacco: Never Used    Alcohol use No    Drug use: No    Sexual activity: Yes     Partners: Male     Birth control/ protection: Condom     Other Topics Concern    Not on file     Social History Narrative         Allergies   Allergen Reactions    Ceclor [Cefaclor] Other (comments)     uticaria    Cephalosporins Other (comments)     swollen joints (as a baby)       Disposition     Follow-up Disposition:  Return if symptoms worsen or fail to improve. No future appointments. Current Medications after this visit     Current Outpatient Prescriptions   Medication Sig    guaiFENesin ER (MUCINEX) 600 mg ER tablet Take 1 Tab by mouth two (2) times a day.  azelastine (ASTELIN) 137 mcg (0.1 %) nasal spray 1 [place] by Both Nostrils route two (2) times a day. Use in each nostril as directed    [START ON 9/24/2018] azithromycin (ZITHROMAX) 250 mg tablet Take 2 tablets today, then take 1 tablet daily    fluticasone (FLONASE) 50 mcg/actuation nasal spray 2 Sprays by Both Nostrils route daily.  cetirizine (ZYRTEC) 10 mg tablet Take 1 Tab by mouth daily.  ferrous sulfate (IRON) 325 mg (65 mg iron) EC tablet TAKE ONE TABLET BY MOUTH TWICE DAILY WITH MEALS     No current facility-administered medications for this visit. There are no discontinued medications.

## 2018-09-21 NOTE — PATIENT INSTRUCTIONS
Saline Nasal Washes: Care Instructions  Your Care Instructions  Saline nasal washes help keep the nasal passages open by washing out thick or dried mucus. This simple remedy can help relieve symptoms of allergies, sinusitis, and colds. It also can make the nose feel more comfortable by keeping the mucous membranes moist. You may notice a little burning sensation in your nose the first few times you use the solution, but this usually gets better in a few days. Follow-up care is a key part of your treatment and safety. Be sure to make and go to all appointments, and call your doctor if you are having problems. It's also a good idea to know your test results and keep a list of the medicines you take. How can you care for yourself at home? · You can buy premixed saline solution in a squeeze bottle or other sinus rinse products at a drugstore. Read and follow the instructions on the label. · You also can make your own saline solution by adding 1 teaspoon of salt and 1 teaspoon of baking soda to 2 cups of distilled water. · If you use a homemade solution, pour a small amount into a clean bowl. Using a rubber bulb syringe, squeeze the syringe and place the tip in the salt water. Pull a small amount of the salt water into the syringe by relaxing your hand. · Sit down with your head tilted slightly back. Do not lie down. Put the tip of the bulb syringe or the squeeze bottle a little way into one of your nostrils. Gently drip or squirt a few drops into the nostril. Repeat with the other nostril. Some sneezing and gagging are normal at first.  · Gently blow your nose. · Wipe the syringe or bottle tip clean after each use. · Repeat this 2 or 3 times a day. · Use nasal washes gently if you have nosebleeds often. When should you call for help? Watch closely for changes in your health, and be sure to contact your doctor if:    · You often get nosebleeds.     · You have problems doing the nasal washes.    Where can you learn more? Go to http://desmond-nazario.info/. Enter [id number] [id number] [phone] in the search box to learn more about \"Saline Nasal Washes: Care Instructions. \"  Current as of: May 12, 2017  Content Version: 11.7  © 9420-1975 [employer]. Care instructions adapted under license by [employer] (which disclaims liability or warranty for this information). If you have questions about a medical condition or this instruction, always ask your healthcare professional. [address] any warranty or liability for your use of this information.

## 2018-09-24 DIAGNOSIS — J30.1 SEASONAL ALLERGIC RHINITIS DUE TO POLLEN: ICD-10-CM

## 2018-09-24 RX ORDER — CETIRIZINE HCL 10 MG
TABLET ORAL
Qty: 30 TAB | Refills: 3 | Status: SHIPPED | OUTPATIENT
Start: 2018-09-24 | End: 2020-11-05 | Stop reason: SDUPTHER

## 2018-12-03 ENCOUNTER — OFFICE VISIT (OUTPATIENT)
Dept: FAMILY MEDICINE CLINIC | Age: 23
End: 2018-12-03

## 2018-12-03 VITALS
WEIGHT: 140 LBS | HEART RATE: 75 BPM | BODY MASS INDEX: 22.5 KG/M2 | SYSTOLIC BLOOD PRESSURE: 123 MMHG | RESPIRATION RATE: 16 BRPM | OXYGEN SATURATION: 100 % | DIASTOLIC BLOOD PRESSURE: 78 MMHG | HEIGHT: 66 IN | TEMPERATURE: 97.9 F

## 2018-12-03 DIAGNOSIS — Z23 ENCOUNTER FOR IMMUNIZATION: Primary | ICD-10-CM

## 2018-12-03 DIAGNOSIS — Z11.1 ENCOUNTER FOR PPD TEST: ICD-10-CM

## 2018-12-03 NOTE — LETTER
12/3/2018 8:15 AM 
 
Ms. Ree Ward [address] [address] To Whom It May Concern: 
 
Ree Hemphill received an influenza vaccine on 12/03/18 Lot # [id number] Expiration Date 06/30/2019 [facility] Immunization History Administered Date(s) Administered  DTaP 1993, 1993, 01/24/1994, 01/27/1995, 08/04/1998  Hep B Vaccine 04/25/1994, 05/31/1994, 01/27/1995  
 Hib 1993, 1993, 01/24/1994, 01/27/1995  Influenza Vaccine 12/07/2012, 11/08/2013  Influenza Vaccine (Quad) PF 09/22/2017  MMR 11/02/1994, 08/04/1998  Poliovirus vaccine 1993, 1993, 11/02/1994, 08/04/1998  TB Skin Test (PPD) Intradermal 07/15/2013, 07/24/2013, 05/19/2014, 06/17/2016, 06/28/2016  Td 08/16/2004  Tdap 08/05/2013 Sincerely, 
 
 
Evette Gasca MD

## 2018-12-05 ENCOUNTER — CLINICAL SUPPORT (OUTPATIENT)
Dept: FAMILY MEDICINE CLINIC | Age: 23
End: 2018-12-05

## 2018-12-05 DIAGNOSIS — Z11.1 ENCOUNTER FOR PPD SKIN TEST READING: Primary | ICD-10-CM

## 2018-12-05 LAB
MM INDURATION POC: 0 MM (ref 0–5)
PPD POC: NEGATIVE NEGATIVE

## 2018-12-05 NOTE — PROGRESS NOTES
PPD Reading Note  PPD read and results entered in [facility]. Result: 0 mm induration.   Interpretation: Negative  If test not read within 48-72 hours of initial placement, patient advised to repeat in other arm 1-3 weeks after this test.  Allergic reaction: no

## 2018-12-05 NOTE — LETTER
12/5/2018 8:03 AM 
 
Ms. Dylan Hernandez [address] [facility] [address] [place], [place] [id number] Results for orders placed or performed in visit on 12/03/18 AMB POC TUBERCULOSIS, INTRADERMAL (SKIN TEST) Result Value Ref Range PPD Negative Negative  
 mm Induration 0 mm Sincerely, 
 
 
Jose M Figueroa MD

## 2018-12-18 ENCOUNTER — OFFICE VISIT (OUTPATIENT)
Dept: FAMILY MEDICINE CLINIC | Age: 23
End: 2018-12-18

## 2018-12-18 VITALS
HEIGHT: 66 IN | RESPIRATION RATE: 20 BRPM | DIASTOLIC BLOOD PRESSURE: 86 MMHG | SYSTOLIC BLOOD PRESSURE: 126 MMHG | TEMPERATURE: 97.7 F | BODY MASS INDEX: 22.31 KG/M2 | WEIGHT: 138.8 LBS | OXYGEN SATURATION: 98 % | HEART RATE: 83 BPM

## 2018-12-18 DIAGNOSIS — J30.1 ACUTE SEASONAL ALLERGIC RHINITIS DUE TO POLLEN: ICD-10-CM

## 2018-12-18 DIAGNOSIS — J06.9 URI, ACUTE: Primary | ICD-10-CM

## 2018-12-18 RX ORDER — FLUTICASONE PROPIONATE 50 MCG
2 SPRAY, SUSPENSION (ML) NASAL DAILY
Qty: 1 BOTTLE | Refills: 3 | Status: SHIPPED | OUTPATIENT
Start: 2018-12-18 | End: 2020-11-05 | Stop reason: SDUPTHER

## 2018-12-18 RX ORDER — AZITHROMYCIN 250 MG/1
TABLET, FILM COATED ORAL
Qty: 6 TAB | Refills: 0 | Status: SHIPPED | OUTPATIENT
Start: 2018-12-18 | End: 2018-12-23

## 2018-12-18 NOTE — PROGRESS NOTES
[facility]    Subjective:   Maryam Aparicio is a 20 y.o. female with history of allergic rhinitis, ADHD  CC: Congestion  History provided by patient     HPI:    Patient presents to clinic complaining of congestion. She states that 2-3 days ago, she started experiencing a \"scratchy throat,\" which self-resolved. However, she then developed cough and congestion. Cough has been productive of yellow sputum, but this morning it was clear. Patient is taking Robitussin QID, which has somewhat helped. 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 Of note, patient has a hx of seasonal allergies , for which she takes Flonase and Zyrtec. She denies fever, H/A, N/V, abdominal pain, diarrhea, myalgias, or dysuria. No other acute concern. Current Outpatient Medications on File Prior to Visit   Medication Sig Dispense Refill    cetirizine (ZYRTEC) 10 mg tablet TAKE ONE TABLET BY MOUTH ONCE DAILY 30 Tab 3    guaiFENesin ER (MUCINEX) 600 mg ER tablet Take 1 Tab by mouth two (2) times a day.  azelastine (ASTELIN) 137 mcg (0.1 %) nasal spray 1 [place] by Both Nostrils route two (2) times a day. Use in each nostril as directed 1 Bottle 0    ferrous sulfate (IRON) 325 mg (65 mg iron) EC tablet TAKE ONE TABLET BY MOUTH TWICE DAILY WITH MEALS 180 Tab 2     No current facility-administered medications on file prior to visit.         Patient Active Problem List   Diagnosis Code    Allergic rhinitis J30.9    ADHD (attention deficit hyperactivity disorder) F90.9    Dermatitis, atopic L20.9    Allergic dermatitis due ingested food L27.2    Abnormal EKG R94.31       Social History     Socioeconomic History    Marital status:      Spouse name: Not on file    Number of children: Not on file    Years of education: Not on file    Highest education level: Not on file   Social Needs    Financial resource strain: Not on file    Food insecurity - worry: Not on file    Food insecurity - inability: Not on file    Transportation needs - medical: Not on file   [employer] needs - non-medical: Not on file   Occupational History    Not on file   Tobacco Use    Smoking status: Never Smoker    Smokeless tobacco: Never Used   Substance and Sexual Activity    Alcohol use: No    Drug use: No    Sexual activity: Yes     Partners: Male     Birth control/protection: Condom   Other Topics Concern    Not on file   Social History Narrative    Not on file       Review of Systems   Constitutional: Negative for chills and fever. HENT: Positive for congestion. Negative for sinus pain and sore throat. Respiratory: Positive for cough and sputum production. Cardiovascular: Negative for chest pain and palpitations. Gastrointestinal: Negative for abdominal pain, diarrhea, nausea and vomiting. Genitourinary: Negative for dysuria. Musculoskeletal: Negative for myalgias. Neurological: Negative for dizziness and headaches. Objective:     Visit Vitals  /86 (BP 1 Location: Right arm, BP Patient Position: Sitting)   Pulse 83   Temp 97.7 °F (36.5 °C) (Oral)   Resp 20   Ht 5' 6\" (1.676 m)   Wt 138 lb 12.8 oz (63 kg)   SpO2 98%   BMI 22.40 kg/m²        Physical Exam   Constitutional: She is oriented to person, place, and time. She appears well-developed and well-nourished. Of stated age, well-groomed, pleasant, in NAD   HENT:   Head: Normocephalic and atraumatic. Mouth/Throat: Oropharynx is clear and moist. No oropharyngeal exudate. Eyes: Conjunctivae and EOM are normal. Pupils are equal, round, and reactive to light. Cardiovascular: Normal rate, regular rhythm and normal heart sounds. Pulmonary/Chest: Effort normal and breath sounds normal. No respiratory distress. She has no wheezes. She has no rales. Abdominal: Soft. Bowel sounds are normal. She exhibits no distension.  There is no tenderness. Musculoskeletal: She exhibits no edema. Neurological: She is alert and oriented to person, place, and time. Skin: Skin is warm. Psychiatric: She has a normal mood and affect. Her behavior is normal. Thought content normal.       Pertinent Labs/Studies: N/A      Assessment and orders:     Pt is 23yro F who presents to clinic for management of congestion    ICD-10-CM ICD-9-CM    1. URI, acute J06.9 465.9 azithromycin (ZITHROMAX) 250 mg tablet   2. Acute seasonal allergic rhinitis due to pollen J30.1 477.0 fluticasone (FLONASE) 50 mcg/actuation nasal spray     Diagnoses and all orders for this visit:    1. URI, acute  Acute onset, likely viral in etiology. Instructed on supportive care at home with adequate hydration, Tylenol prn in case of fever, humidifier, robitussin/tessalon perles for cough, etc... Since patient is worried that she won't be able to get in touch with physician as soon as possible because of holiday season, will order Abx to be picked up in a week if Sx have not resolved. Given cephalosporin allergies, preferred Augmentin or Doxycyline for therapy. However, patient states she has never had those Rx in the past and is requesting a Z-joe which has previously worked for similar symptoms, despite medical opinion. Will send Abx to pharmacy, and advised to call clinic back in case of any medical questions or worsening of condition. -     azithromycin (ZITHROMAX) 250 mg tablet; Take 2 tablets today, then take 1 tablet daily      2. Acute seasonal allergic rhinitis due to pollen  Refills sent to pharmacy. -     fluticasone (FLONASE) 50 mcg/actuation nasal spray; 2 Sprays by Both Nostrils route daily. Follow-up Disposition:  Return if symptoms worsen or fail to improve. I have reviewed patient medical and social history and medications. I have reviewed pertinent labs results and other data.  I have discussed the diagnosis with the patient and the intended plan as seen in the above orders. The patient has received an after-visit summary and questions were answered concerning future plans. I have discussed medication side effects and warnings with the patient as well.     Luis Felipe Vasquez MD  Resident [facility]  12/18/18

## 2018-12-18 NOTE — PROGRESS NOTES
1. Have you been to the ER, urgent care clinic since your last visit? Hospitalized since your last visit? No    2. Have you seen or consulted any other health care providers outside of the [address] [place] [address] since your last visit? Include any pap smears or colon screening.  No  Reviewed record in preparation for visit and have necessary documentation  Pt did not bring medication to office visit for review  Information was given to pt on Advanced Directives, Living Will  Information was given on Shingles Vaccine  opportunity was given for questions  Goals that were addressed and/or need to be completed during or after this appointment include     Health Maintenance Due   Topic Date Due    HPV Age 9Y-34Y (1 - Female 3-dose series) 07/22/2004    PAP AKA CERVICAL CYTOLOGY  07/22/2014

## 2019-02-28 ENCOUNTER — OFFICE VISIT (OUTPATIENT)
Dept: FAMILY MEDICINE CLINIC | Age: 24
End: 2019-02-28

## 2019-02-28 VITALS
OXYGEN SATURATION: 99 % | RESPIRATION RATE: 18 BRPM | HEIGHT: 66 IN | HEART RATE: 73 BPM | BODY MASS INDEX: 21.92 KG/M2 | DIASTOLIC BLOOD PRESSURE: 85 MMHG | SYSTOLIC BLOOD PRESSURE: 126 MMHG | TEMPERATURE: 97.7 F | WEIGHT: 136.4 LBS

## 2019-02-28 DIAGNOSIS — F43.21 GRIEF: ICD-10-CM

## 2019-02-28 DIAGNOSIS — F41.9 ANXIETY: Primary | ICD-10-CM

## 2019-02-28 RX ORDER — ESCITALOPRAM OXALATE 10 MG/1
10 TABLET ORAL DAILY
Qty: 42 TAB | Refills: 0 | Status: SHIPPED | OUTPATIENT
Start: 2019-02-28 | End: 2019-03-12 | Stop reason: ALTCHOICE

## 2019-02-28 RX ORDER — HYDROXYZINE PAMOATE 25 MG/1
25 CAPSULE ORAL
Qty: 30 CAP | Refills: 0 | Status: SHIPPED | OUTPATIENT
Start: 2019-02-28 | End: 2019-03-14
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
## 2019-02-28 NOTE — LETTER
NOTIFICATION RETURN TO WORK  
 
2/28/2019 11:05 AM 
 
Ms. Sam Geronimo [address] [place] [phone] To Whom It May Concern: 
 
Sam Geronimo is currently under the care of Tate Moscoso. She will return to work on: Monday 3/4/2019 If there are questions or concerns please have the patient contact our office.  
 
 
 
Sincerely, 
 
 
Dimas Goldmann, MD

## 2019-02-28 NOTE — PATIENT INSTRUCTIONS
Take atarax 25mg three times a day as needed when feeling anxious. Call me if this medication makes you feel sleepy we will change to a different medication. Take lexapro 10mg daily starting today. Adjustment Disorder: Care Instructions  Your Care Instructions    Adjustment disorder means that you have emotional or behavioral problems because of stress. But your response to the stress is far more severe than a normal response. It is severe enough to affect your work or social life and may cause depression and physical pains and problems. Events that may cause this response can include a divorce, money problems, or starting school or a new job. It might be anything that causes some stress. This disorder is most often a short-term problem. It happens within 3 months of the stressful event or change. If the response lasts longer than 6 months after the event ends, you may have a more serious disorder. Follow-up care is a key part of your treatment and safety. Be sure to make and go to all appointments, and call your doctor if you are having problems. It's also a good idea to know your test results and keep a list of the medicines you take. How can you care for yourself at home? · Go to all counseling sessions. Do not skip any because you are feeling better. · If your doctor prescribed medicines, take them exactly as prescribed. Call your doctor if you think you are having a problem with your medicine. You will get more details on the specific medicines your doctor prescribes. · Discuss the causes of your stress with a good friend or family member. Or you can join a support group for people with similar problems. Talking to others sometimes relieves stress. · Get at least 30 minutes of exercise on most days of the week. Walking is a good choice. You also may want to do other activities, such as running, swimming, cycling, or playing tennis or team sports.   Relaxation techniques  Do relaxation exercises 10 to 20 minutes a day. You can play soothing, relaxing music while you do them, if you wish. · Tell others in your house that you are going to do your relaxation exercises. Ask them not to disturb you. · Find a comfortable, quiet place. · Lie down on your back, or sit with your back straight. · Focus on your breathing. Make it slow and steady. · Breathe in through your nose. Breathe out through either your nose or mouth. · Breathe deeply, filling up the area between your navel and your rib cage. Breathe so that your belly goes up and down. · Do not hold your breath. · Breathe like this for 5 to 10 minutes. Notice the feeling of calmness throughout your whole body. As you continue to breathe slowly and deeply, relax by doing these next steps for another 5 to 10 minutes:  · Tighten and relax each muscle group in your body. Start at your toes, and work your way up to your head. · Imagine your muscle groups relaxing and getting heavy. · Empty your mind of all thoughts. · Let yourself relax more and more deeply. · Be aware of the state of calmness that surrounds you. · When your relaxation time is over, you can bring yourself back to alertness by moving your fingers and toes. Then move your hands and feet. And then move your entire body. Sometimes people fall asleep during relaxation. But they most often wake up soon. · Always give yourself time to return to full alertness before you drive a car. Wait to do anything that might cause an accident if you are not fully alert. Never play a relaxation tape while you drive a car. When should you call for help? Call 911 anytime you think you may need emergency care. For example, call if:    · You feel you cannot stop from hurting yourself or someone else. Keep the numbers for these national suicide hotlines: 1-200-046-TALK (8-345.685.1829) and 6-467-HWDNLHB (4-487.274.3862). If you or someone you know talks about suicide or feeling hopeless, get help right away.  Watch closely for changes in your health, and be sure to contact your doctor if:    · You have new anxiety, or your anxiety gets worse.     · You have been feeling sad, depressed, or hopeless or have lost interest in things that you usually enjoy.     · You do not get better as expected. Where can you learn more? Go to http://desmond-nazario.info/. Enter [id number] in the search box to learn more about \"Adjustment Disorder: Care Instructions. \"  Current as of: June 28, 2018  Content Version: 11.9  © 1844-7652 [employer]. Care instructions adapted under license by [employer] (which disclaims liability or warranty for this information). If you have questions about a medical condition or this instruction, always ask your healthcare professional. [place] [address] any warranty or liability for your use of this information. Anxiety Disorder: Care Instructions  Your Care Instructions    Anxiety is a normal reaction to stress. Difficult situations can cause you to have symptoms such as sweaty palms and a nervous feeling. In an anxiety disorder, the symptoms are far more severe. Constant worry, muscle tension, trouble sleeping, nausea and diarrhea, and other symptoms can make normal daily activities difficult or impossible. These symptoms may occur for no reason, and they can affect your work, school, or social life. Medicines, counseling, and self-care can all help. Follow-up care is a key part of your treatment and safety. Be sure to make and go to all appointments, and call your doctor if you are having problems. It's also a good idea to know your test results and keep a list of the medicines you take. How can you care for yourself at home? · Take medicines exactly as directed. Call your doctor if you think you are having a problem with your medicine. · Go to your counseling sessions and follow-up appointments. · Recognize and accept your anxiety. Then, when you are in a situation that makes you anxious, say to yourself, \"This is not an emergency. I feel uncomfortable, but I am not in danger. I can keep going even if I feel anxious. \"  · Be kind to your body:  ? Relieve tension with exercise or a massage. ? Get enough rest.  ? Avoid alcohol, caffeine, nicotine, and illegal drugs. They can increase your anxiety level and cause sleep problems. ? Learn and do relaxation techniques. See below for more about these techniques. · Engage your mind. Get out and do something you enjoy. Go to a [employer] movie, or take a walk or hike. Plan your day. Having too much or too little to do can make you anxious. · Keep a record of your symptoms. Discuss your fears with a good friend or family member, or join a support group for people with similar problems. Talking to others sometimes relieves stress. · Get involved in social groups, or volunteer to help others. Being alone sometimes makes things seem worse than they are. · Get at least 30 minutes of exercise on most days of the week to relieve stress. Walking is a good choice. You also may want to do other activities, such as running, swimming, cycling, or playing tennis or team sports. Relaxation techniques  Do relaxation exercises 10 to 20 minutes a day. You can play soothing, relaxing music while you do them, if you wish. · Tell others in your house that you are going to do your relaxation exercises. Ask them not to disturb you. · Find a comfortable place, away from all distractions and noise. · Lie down on your back, or sit with your back straight. · Focus on your breathing. Make it slow and steady. · Breathe in through your nose. Breathe out through either your nose or mouth. · Breathe deeply, filling up the area between your navel and your rib cage. Breathe so that your belly goes up and down. · Do not hold your breath. · Breathe like this for 5 to 10 minutes.  Notice the feeling of calmness throughout your whole body. As you continue to breathe slowly and deeply, relax by doing the following for another 5 to 10 minutes:  · Tighten and relax each muscle group in your body. You can begin at your toes and work your way up to your head. · Imagine your muscle groups relaxing and becoming heavy. · Empty your mind of all thoughts. · Let yourself relax more and more deeply. · Become aware of the state of calmness that surrounds you. · When your relaxation time is over, you can bring yourself back to alertness by moving your fingers and toes and then your hands and feet and then stretching and moving your entire body. Sometimes people fall asleep during relaxation, but they usually wake up shortly afterward. · Always give yourself time to return to full alertness before you drive a car or do anything that might cause an accident if you are not fully alert. Never play a relaxation tape while you drive a car. When should you call for help? Call 911 anytime you think you may need emergency care. For example, call if:    · You feel you cannot stop from hurting yourself or someone else.   Nicola Andrew the numbers for these national suicide hotlines: 3-176-337-TALK (4-424-836-264.415.6942) and 0-869-BXCRIBM (7-303.711.2325). If you or someone you know talks about suicide or feeling hopeless, get help right away.   Watch closely for changes in your health, and be sure to contact your doctor if:    · You have anxiety or fear that affects your life.     · You have symptoms of anxiety that are new or different from those you had before. Where can you learn more? Go to http://desmond-nazario.info/. Enter P754 in the search box to learn more about \"Anxiety Disorder: Care Instructions. \"  Current as of: September 11, 2018  Content Version: 11.9  © 5382-5801 [employer]. Care instructions adapted under license by "[employer] (which disclaims liability or warranty for this information).  If you have questions about a medical condition or this instruction, always ask your healthcare professional. [place] [address] any warranty or liability for your use of this information.

## 2019-02-28 NOTE — PROGRESS NOTES
1. Have you been to the ER, urgent care clinic since your last visit? Hospitalized since your last visit? No    2. Have you seen or consulted any other health care providers outside of the [address] [place] [address] since your last visit? Include any pap smears or colon screening.  No  Reviewed record in preparation for visit and have necessary documentation  Pt did not bring medication to office visit for review  Goals that were addressed and/or need to be completed during or after this appointment include     Health Maintenance Due   Topic Date Due    HPV Age 9Y-34Y (1 - Female 3-dose series) 07/22/2004    PAP AKA CERVICAL CYTOLOGY  07/22/2014

## 2019-02-28 NOTE — PROGRESS NOTES
I discussed the findings, assessment and plan in detail with the resident and agree with the resident's findings and plan as documented in the resident's note. Richard Sharma M.D.

## 2019-03-04 ENCOUNTER — TELEPHONE (OUTPATIENT)
Dept: FAMILY MEDICINE CLINIC | Age: 24
End: 2019-03-04

## 2019-03-04 NOTE — TELEPHONE ENCOUNTER
Please call Pt. The medication has her like a \"zombie\" she is so tired and not even able to go into work. Dr. Missy Schulte told her to call her if she had any problems with her medication. She feels like only sleeping. Please call her so she can speak to her or Nurse Sathish Gamble about how to wean off of this medication safely.

## 2019-03-04 NOTE — TELEPHONE ENCOUNTER
Pt returned call. Please call back. [phone] (home) She was in the bathroom when you called. She will keep the phone with her. She is sorry she missed you because she had been waiting for the call. Please call her back.  Thanks

## 2019-03-04 NOTE — TELEPHONE ENCOUNTER
[facility] MEDICINE RESIDENCY PROGRAM             Attempted to call ms. Johnson to discuss current symptoms. Pt was seen last week for anxiety and started on lexapro and atarax. I need to be able to tease out which medication needs to be wean whether its the lexapro vs atarax. Tell pt to stop taking atarax for now. Take lexapro 5mg daily fow now.           Kaleb Croft MD  Family Medicine Resident  PGY 3

## 2019-03-04 NOTE — TELEPHONE ENCOUNTER
Spoke with patient and informed her that I will talk with Dr. Muriel Burdick in the morning and call her back then.

## 2019-03-12 ENCOUNTER — OFFICE VISIT (OUTPATIENT)
Dept: FAMILY MEDICINE CLINIC | Age: 24
End: 2019-03-12

## 2019-03-12 ENCOUNTER — TELEPHONE (OUTPATIENT)
Dept: FAMILY MEDICINE CLINIC | Age: 24
End: 2019-03-12

## 2019-03-12 VITALS
DIASTOLIC BLOOD PRESSURE: 77 MMHG | HEART RATE: 82 BPM | BODY MASS INDEX: 21.86 KG/M2 | TEMPERATURE: 97.1 F | HEIGHT: 66 IN | RESPIRATION RATE: 16 BRPM | WEIGHT: 136 LBS | OXYGEN SATURATION: 98 % | SYSTOLIC BLOOD PRESSURE: 113 MMHG

## 2019-03-12 DIAGNOSIS — F41.9 ANXIETY: Primary | ICD-10-CM

## 2019-03-12 RX ORDER — SERTRALINE HYDROCHLORIDE 25 MG/1
25 TABLET, FILM COATED ORAL DAILY
Qty: 30 TAB | Refills: 0 | Status: SHIPPED | OUTPATIENT
Start: 2019-03-12 | End: 2019-04-09 | Stop reason: SDUPTHER

## 2019-03-12 NOTE — PROGRESS NOTES
1. Have you been to the ER, urgent care clinic since your last visit? Hospitalized since your last visit? no    2. Have you seen or consulted any other health care providers outside of the [address] [place] [address] since your last visit? Include any pap smears or colon screening. no  Reviewed record in preparation for visit and have obtained necessary documentation. Patient did not bring medications to visit for review. Information provided on Advanced Directive, Living Will. Body mass index is 21.95 kg/m².    Health Maintenance Due   Topic Date Due    HPV Age 9Y-34Y (1 - Female 3-dose series) 07/22/2004    PAP AKA CERVICAL CYTOLOGY  07/22/2014

## 2019-03-12 NOTE — PATIENT INSTRUCTIONS
A Healthy Lifestyle: Care Instructions  Your Care Instructions    A healthy lifestyle can help you feel good, stay at a healthy weight, and have plenty of energy for both work and play. A healthy lifestyle is something you can share with your whole family. A healthy lifestyle also can lower your risk for serious health problems, such as high blood pressure, heart disease, and diabetes. You can follow a few steps listed below to improve your health and the health of your family. Follow-up care is a key part of your treatment and safety. Be sure to make and go to all appointments, and call your doctor if you are having problems. It's also a good idea to know your test results and keep a list of the medicines you take. How can you care for yourself at home? · Do not eat too much sugar, fat, or fast foods. You can still have dessert and treats now and then. The goal is moderation. · Start small to improve your eating habits. Pay attention to portion sizes, drink less juice and soda pop, and eat more fruits and vegetables. ? Eat a healthy amount of food. A 3-ounce serving of meat, for example, is about the size of a deck of cards. Fill the rest of your plate with vegetables and whole grains. ? Limit the amount of soda and sports drinks you have every day. Drink more water when you are thirsty. ? Eat at least 5 servings of fruits and vegetables every day. It may seem like a lot, but it is not hard to reach this goal. A serving or helping is 1 piece of fruit, 1 cup of vegetables, or 2 cups of leafy, raw vegetables. Have an apple or some carrot sticks as an afternoon snack instead of a candy bar. Try to have fruits and/or vegetables at every meal.  · Make exercise part of your daily routine. You may want to start with simple activities, such as walking, bicycling, or slow swimming. Try to be active 30 to 60 minutes every day. You do not need to do all 30 to 60 minutes all at once.  For example, you can exercise 3 times a day for 10 or 20 minutes. Moderate exercise is safe for most people, but it is always a good idea to talk to your doctor before starting an exercise program.  · Keep moving. Kiel Mass the lawn, work in the garden, or [employer]. Take the stairs instead of the elevator at work. · If you smoke, quit. People who smoke have an increased risk for heart attack, stroke, cancer, and other lung illnesses. Quitting is hard, but there are ways to boost your chance of quitting tobacco for good. ? Use nicotine gum, patches, or lozenges. ? Ask your doctor about stop-smoking programs and medicines. ? Keep trying. In addition to reducing your risk of diseases in the future, you will notice some benefits soon after you stop using tobacco. If you have shortness of breath or asthma symptoms, they will likely get better within a few weeks after you quit. · Limit how much alcohol you drink. Moderate amounts of alcohol (up to 2 drinks a day for men, 1 drink a day for women) are okay. But drinking too much can lead to liver problems, high blood pressure, and other health problems. Family health  If you have a family, there are many things you can do together to improve your health. · Eat meals together as a family as often as possible. · Eat healthy foods. This includes fruits, vegetables, lean meats and dairy, and whole grains. · Include your family in your fitness plan. Most people think of activities such as jogging or tennis as the way to fitness, but there are many ways you and your family can be more active. Anything that makes you breathe hard and gets your heart pumping is exercise. Here are some tips:  ? Walk to do errands or to take your child to school or the bus.  ? Go for a family bike ride after dinner instead of watching TV. Where can you learn more? Go to http://desmond-nazario.info/. Enter T808 in the search box to learn more about \"A Healthy Lifestyle: Care Instructions. \"  Current as of: September 11, 2018  Content Version: 11.9  © 4687-8790 [employer]. Care instructions adapted under license by [employer] (which disclaims liability or warranty for this information). If you have questions about a medical condition or this instruction, always ask your healthcare professional. [address] any warranty or liability for your use of this information.

## 2019-03-12 NOTE — PROGRESS NOTES
[id number] [facility] Program,    [address]   Affiliated with [facility] and Sheldon [facility]well Note   Subjective:   Lashonda Gonsalves is a 20 y.o. female presents for evaluation of anxiety   CC: \" Can I try something else\"  History provided by patient     HPI:    Pt started taking Lexapro 10mg  for 3 weeks ago. She stopped taking Lexapro after 4 days due to symptoms of severe lethargy,insomnia, jaw tremors and poor po intake. Since being off the Lexapro for the past 1.5 weeks. She is now back to a normal sleep schedule without side effects. However, she feels anxious at spontaneous moments. She decided not to return to work and is taking some time off from work. She is requesting that she try another medication. Current Outpatient Medications on File Prior to Visit   Medication Sig Dispense Refill    fluticasone (FLONASE) 50 mcg/actuation nasal spray 2 Sprays by Both Nostrils route daily. 1 Bottle 3    cetirizine (ZYRTEC) 10 mg tablet TAKE ONE TABLET BY MOUTH ONCE DAILY 30 Tab 3    guaiFENesin ER (MUCINEX) 600 mg ER tablet Take 1 Tab by mouth two (2) times a day.  azelastine (ASTELIN) 137 mcg (0.1 %) nasal spray 1 Lutsen by Both Nostrils route two (2) times a day. Use in each nostril as directed 1 Bottle 0    ferrous sulfate (IRON) 325 mg (65 mg iron) EC tablet TAKE ONE TABLET BY MOUTH TWICE DAILY WITH MEALS 180 Tab 2     No current facility-administered medications on file prior to visit.         Past Medical History:   Diagnosis Date    Ill-defined condition     anemia       Social History     Socioeconomic History    Marital status:      Spouse name: Not on file    Number of children: Not on file    Years of education: Not on file    Highest education level: Not on file   Social Needs    Financial resource strain: Not on file    Food insecurity - worry: Not on file    Food insecurity - inability: Not on file    Transportation needs - medical: Not on file   Dean Transportation needs - non-medical: Not on file   Occupational History    Not on file   Tobacco Use    Smoking status: Never Smoker    Smokeless tobacco: Never Used   Substance and Sexual Activity    Alcohol use: No    Drug use: No    Sexual activity: Yes     Partners: Male     Birth control/protection: Condom   Other Topics Concern    Not on file   Social History Narrative    Not on file       Review of Systems   Constitutional: Negative for chills and fever. Eyes: Negative for blurred vision and double vision. Respiratory: Negative for cough and hemoptysis. Cardiovascular: Negative for chest pain. Gastrointestinal: Negative for abdominal pain, constipation, diarrhea, nausea and vomiting. Genitourinary: Negative for dysuria. Musculoskeletal: Negative for myalgias. Skin: Negative for itching and rash. Neurological: Negative for dizziness, tingling, sensory change, focal weakness and headaches. Psychiatric/Behavioral: Negative for hallucinations, memory loss, substance abuse and suicidal ideas. The patient is nervous/anxious. The patient does not have insomnia. Objective:     Visit Vitals  /77 (BP 1 Location: Right arm, BP Patient Position: Sitting)   Pulse 82   Temp 97.1 °F (36.2 °C) (Oral)   Resp 16   Ht 5' 6\" (1.676 m)   Wt 136 lb (61.7 kg)   LMP 01/08/2019   SpO2 98%   BMI 21.95 kg/m²      Physical Exam:  Physical Exam   Constitutional: She is oriented to person, place, and time. She appears well-developed and well-nourished.   figidity   HENT:   Head: Normocephalic and atraumatic. Eyes: Conjunctivae are normal. Pupils are equal, round, and reactive to light. Right eye exhibits no discharge. Left eye exhibits no discharge. No scleral icterus. Neck: Normal range of motion. Neck supple. Cardiovascular: Normal rate, regular rhythm, normal heart sounds and intact distal pulses. Exam reveals no gallop and no friction rub. No murmur heard.   Pulmonary/Chest: Effort normal and breath sounds normal. No respiratory distress. She has no wheezes. She has no rales. She exhibits no tenderness. Abdominal: Soft. Bowel sounds are normal. There is no tenderness. Musculoskeletal: Normal range of motion. She exhibits no edema, tenderness or deformity. Neurological: She is alert and oriented to person, place, and time. No cranial nerve deficit. Skin: Skin is warm and dry. No rash noted. No erythema. Psychiatric: She has a normal mood and affect. Her behavior is normal. Judgment and thought content normal.   Nursing note and vitals reviewed. Assessment and orders:       ICD-10-CM ICD-9-CM    1. Anxiety F41.9 300.00 sertraline (ZOLOFT) 25 mg tablet     Diagnoses and all orders for this visit:    1. Anxiety  -     sertraline (ZOLOFT) 25 mg tablet; Take 1 Tab by mouth daily. Follow-up Disposition:  Return in about 4 weeks (around 4/9/2019) for routine medication follow up. I have reviewed patient medical and social history and medications. I have reviewed pertinent labs results and other data. I have discussed the diagnosis with the patient and the intended plan as seen in the above orders. The patient has received an after-visit summary and questions were answered concerning future plans. I have discussed medication side effects and warnings with the patient as well.     Brandon Anne MD  Resident [facility]  03/17/19

## 2019-03-12 NOTE — TELEPHONE ENCOUNTER
Pt called wanting to know if Dr. Keon Win would consider prescribing her Zoloft. She states that Dr. Keon Win is aware of the last medication and how she did not do well with it. She had an appt for this morning for a f/u on medication but would like to know if Dr. Keon Win would consider prescribing the zoloft w/out having to come in for an appt since she is already aware of what's going on. Pt would like to know as soon as possible in case she needs to come in for an appt today.

## 2019-03-21 ENCOUNTER — OFFICE VISIT (OUTPATIENT)
Dept: FAMILY MEDICINE CLINIC | Age: 24
End: 2019-03-21

## 2019-03-21 VITALS
HEIGHT: 66 IN | BODY MASS INDEX: 21.86 KG/M2 | HEART RATE: 79 BPM | DIASTOLIC BLOOD PRESSURE: 68 MMHG | SYSTOLIC BLOOD PRESSURE: 107 MMHG | TEMPERATURE: 97.1 F | OXYGEN SATURATION: 100 % | RESPIRATION RATE: 16 BRPM | WEIGHT: 136 LBS

## 2019-03-21 DIAGNOSIS — F41.9 ANXIETY: ICD-10-CM

## 2019-03-21 DIAGNOSIS — R52 BODY ACHES: ICD-10-CM

## 2019-03-21 DIAGNOSIS — R09.81 NASAL CONGESTION: Primary | ICD-10-CM

## 2019-03-21 LAB
QUICKVUE INFLUENZA TEST: NEGATIVE
VALID INTERNAL CONTROL?: YES

## 2019-03-21 NOTE — PROGRESS NOTES
Progress Note    Patient: Belem Michel MRN: [id number]  SSN: [id number]    YOB: 1995  Age: 20 y.o. Sex: female        Chief Complaint   Patient presents with    Nasal Congestion         Subjective:     Problems addressed:  Encounter Diagnoses     ICD-10-CM ICD-9-CM   1. Nasal congestion R09.81 478.19   2. Body aches R52 780.96   3. Anxiety F41.9 300.00     19 y/o F with PMH of anxiety and ADHD presents with chief complaint of nasal congestion, body aches, and concern for the flu. Symptoms began 2 days ago with runny nose, nasal congestion, sneezing, and progressed to feeling \"achy\" yesterday. Pt took temperature at home and it was 99.2. Took some tylenol and felt a little better. Symptoms are worse in the evening. Pt reports a history of \"bad seasonal allergies\". Pt is taking flonase and zyrtec. Pt recently started on Zoloft 3/12/19 for anxiety. Pt is taking Zoloft as prescribed,  notes improvement in pt's anxiety since starting Zoloft. Pt denies any dizziness, chills, chest pain, SOB, nausea, vomiting, dysuria, diarrhea, or constipation. Current and past medical information:    Current Medications after this visit[de-identified]     Current Outpatient Medications   Medication Sig    sertraline (ZOLOFT) 25 mg tablet Take 1 Tab by mouth daily.  fluticasone (FLONASE) 50 mcg/actuation nasal spray 2 Sprays by Both Nostrils route daily.  cetirizine (ZYRTEC) 10 mg tablet TAKE ONE TABLET BY MOUTH ONCE DAILY    guaiFENesin ER (MUCINEX) 600 mg ER tablet Take 1 Tab by mouth two (2) times a day.  ferrous sulfate (IRON) 325 mg (65 mg iron) EC tablet TAKE ONE TABLET BY MOUTH TWICE DAILY WITH MEALS    azelastine (ASTELIN) 137 mcg (0.1 %) nasal spray 1 [place] by Both Nostrils route two (2) times a day. Use in each nostril as directed     No current facility-administered medications for this visit.         Patient Active Problem List    Diagnosis Date Noted    Abnormal EKG 11/21/2017    Allergic dermatitis due ingested food 02/27/2015    Allergic rhinitis 05/05/2010    ADHD (attention deficit hyperactivity disorder) 05/05/2010    Dermatitis, atopic 05/05/2010       Past Medical History:   Diagnosis Date    Ill-defined condition     anemia       Allergies   Allergen Reactions    Ceclor [Cefaclor] Other (comments)     uticaria    Cephalosporins Other (comments)     swollen joints (as a baby)       Past Surgical History:   Procedure Laterality Date    HX HEENT      teeth       Social History     Socioeconomic History    Marital status:      Spouse name: Not on file    Number of children: Not on file    Years of education: Not on file    Highest education level: Not on file   Tobacco Use    Smoking status: Never Smoker    Smokeless tobacco: Never Used   Substance and Sexual Activity    Alcohol use: No    Drug use: No    Sexual activity: Yes     Partners: Male     Birth control/protection: Condom         Review of Systems   Constitutional: Negative for chills and fever. HENT: Positive for congestion. Respiratory: Negative for cough and shortness of breath. Cardiovascular: Negative for chest pain. Gastrointestinal: Negative for constipation, diarrhea, nausea and vomiting. Genitourinary: Negative for dysuria. Musculoskeletal: Negative for myalgias. Objective:     Vitals:    03/21/19 0807   BP: 107/68   Pulse: 79   Resp: 16   Temp: 97.1 °F (36.2 °C)   TempSrc: Oral   SpO2: 100%   Weight: 136 lb (61.7 kg)   Height: 5' 6\" (1.676 m)      Body mass index is 21.95 kg/m². Physical Exam   Constitutional: She appears well-developed and well-nourished. No distress. HENT:   Head: Normocephalic and atraumatic. Right Ear: External ear normal.   Left Ear: External ear normal.   Mouth/Throat: Oropharynx is clear and moist. No oropharyngeal exudate. Eyes: Pupils are equal, round, and reactive to light. Conjunctivae and EOM are normal.   Neck: Normal range of motion.  Neck supple. Cardiovascular: Normal rate, regular rhythm and normal heart sounds. No murmur heard. Pulmonary/Chest: Effort normal and breath sounds normal. No respiratory distress. She has no wheezes. She has no rales. Abdominal: Soft. Bowel sounds are normal. She exhibits no distension. There is no tenderness. Musculoskeletal: Normal range of motion. Lymphadenopathy:     She has no cervical adenopathy. Neurological: She is alert. Skin: Skin is warm and dry. She is not diaphoretic. No erythema. Psychiatric: Her mood appears anxious. Vitals reviewed. Health Maintenance Due   Topic Date Due    HPV Age 9Y-34Y (1 - Female 3-dose series) 07/22/2008    PAP AKA CERVICAL CYTOLOGY  07/22/2014       Assessment and orders:     Encounter Diagnoses     ICD-10-CM ICD-9-CM   1. Nasal congestion R09.81 478.19   2. Body aches R52 780.96   3. Anxiety F41.9 300.00       19 y/o F with 2 days of nasal congestion, runny nose, and body aches. 1. Nasal congestion - Likely allergies vs URI  -Continue Flonase  -Continue Zyrtec  -Supportive treatment    2. Body aches - Rapid flu negative, likely due to URI  -Continue Flonase, Zyrtec for allergy symptoms  -Supportive treatment    3. Anxiety - Improving, pt taking Zoloft as prescribed  -Continue Zoloft  -Go to follow up appointment 4/9/19          Plan of care:  Discussed diagnoses in detail with patient. Medication risks/benefits/side effects discussed with patient. All of the patient's questions were addressed. The patient understands and agrees with our plan of care. The patient knows to call back if they are unsure of or forget any changes we discussed today or if the symptoms change. The patient received an After-Visit Summary which contains VS, orders, medication list and allergy list. This can be used as a \"mini-medical record\" should they have to seek medical care while out of town.         Future Appointments   Date Time Provider Benjamin Mercado 4/9/2019  9:15 AM Trae Mar MD [facility] DEL SCHED       Signed By: Debi Savage MD     March 21, 2019

## 2019-03-21 NOTE — PROGRESS NOTES
I discussed the findings, assessment and plan in detail with the resident and agree with the resident's findings and plan as documented in the resident's note. Ruben Rueda M.D.

## 2019-03-21 NOTE — PATIENT INSTRUCTIONS

## 2019-03-21 NOTE — PROGRESS NOTES
1. Have you been to the ER, urgent care clinic since your last visit? Hospitalized since your last visit? no    2. Have you seen or consulted any other health care providers outside of the [address] [place] [address] since your last visit? Include any pap smears or colon screening. no  Reviewed record in preparation for visit and have obtained necessary documentation. Patient did not bring medications to visit for review. Information provided on Advanced Directive, Living Will. Body mass index is 21.95 kg/m².    Health Maintenance Due   Topic Date Due    HPV Age 9Y-34Y (1 - Female 3-dose series) 07/22/2008    PAP AKA CERVICAL CYTOLOGY  07/22/2014

## 2019-03-25 DIAGNOSIS — E61.1 IRON DEFICIENCY: ICD-10-CM

## 2019-03-25 RX ORDER — FERROUS SULFATE 325(65) MG
TABLET, DELAYED RELEASE (ENTERIC COATED) ORAL
Qty: 60 TAB | Refills: 2 | Status: SHIPPED | OUTPATIENT
Start: 2019-03-25 | End: 2019-12-04 | Stop reason: SDUPTHER

## 2019-03-26 DIAGNOSIS — E61.1 IRON DEFICIENCY: ICD-10-CM

## 2019-03-26 RX ORDER — FERROUS SULFATE 325(65) MG
TABLET, DELAYED RELEASE (ENTERIC COATED) ORAL
Qty: 60 TAB | Refills: 2 | OUTPATIENT
Start: 2019-03-26

## 2019-03-26 NOTE — TELEPHONE ENCOUNTER
Antionette Garcia, requesting a refill for Rx\"Ferrous sulfate 325 mg\" to be called [facility] at [phone]. Best [phone].   Last office visit was 3/21/19

## 2019-04-03 ENCOUNTER — OFFICE VISIT (OUTPATIENT)
Dept: CARDIOLOGY CLINIC | Age: 24
End: 2019-04-03

## 2019-04-03 ENCOUNTER — CLINICAL SUPPORT (OUTPATIENT)
Dept: CARDIOLOGY CLINIC | Age: 24
End: 2019-04-03

## 2019-04-03 ENCOUNTER — TELEPHONE (OUTPATIENT)
Dept: CARDIOLOGY CLINIC | Age: 24
End: 2019-04-03

## 2019-04-03 VITALS
DIASTOLIC BLOOD PRESSURE: 70 MMHG | BODY MASS INDEX: 21.86 KG/M2 | SYSTOLIC BLOOD PRESSURE: 108 MMHG | OXYGEN SATURATION: 99 % | WEIGHT: 136 LBS | HEIGHT: 66 IN | RESPIRATION RATE: 16 BRPM | HEART RATE: 68 BPM

## 2019-04-03 DIAGNOSIS — I95.1 POSTURAL HYPOTENSION: ICD-10-CM

## 2019-04-03 DIAGNOSIS — R55 SYNCOPE, UNSPECIFIED SYNCOPE TYPE: ICD-10-CM

## 2019-04-03 DIAGNOSIS — R00.2 HEART PALPITATIONS: ICD-10-CM

## 2019-04-03 DIAGNOSIS — R42 POSTURAL DIZZINESS WITH NEAR SYNCOPE: Primary | ICD-10-CM

## 2019-04-03 DIAGNOSIS — R42 DIZZINESS: ICD-10-CM

## 2019-04-03 DIAGNOSIS — R55 POSTURAL DIZZINESS WITH NEAR SYNCOPE: Primary | ICD-10-CM

## 2019-04-03 DIAGNOSIS — F43.9 STRESS: ICD-10-CM

## 2019-04-03 DIAGNOSIS — I45.10 INCOMPLETE RBBB: ICD-10-CM

## 2019-04-03 DIAGNOSIS — R07.89 CHEST TIGHTNESS: ICD-10-CM

## 2019-04-03 NOTE — PROGRESS NOTES
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 Ophthalmologist soon.

## 2019-04-03 NOTE — PROGRESS NOTES
Usman Duong Alex     1995       David England MD, [facility]  Date of Visit-4/3/2019   PCP is Janes Ferrer MD   [facility] [place]  [facility]  HPI:  Rolly Rodarte is a 20 y.o. female   Ms. Hazel Hopson was seen a couple years ago. She has a history of some palpitations and dizziness. We felt she may have some postural hypotension syndrome. We recommended that she increase her fluid intake, including Gatorade, and get a loop monitor. She did not get a loop monitor. In the meantime she had been under some stress. She graduated from nursing school, got  and her uncle , and she feels like she is under more stress and was placed on Zoloft. She felt before that that her palpitations had increased somewhat, though she said the advice gave her helped. She also noticed some changes in vision and feeling dizzy. This has occurred when she was driving once, it was not severe. It then occurred when she drove past the same spot where she felt she got anxious, and one time when reading with her phone she felt like she had blurred vision. She is going to go see her eye doctor. Her blood pressure runs 105. Her physical exam is otherwise normal.  She is a very active person. She wears sports stockings at work. She drinks Gatorade in regular measures. Assessment/Plan: Suspect she has episodes of hypotension that relate to a low baseline blood pressure and perhaps anxiety and palpitations. The question is whether she has a significant form of arrhythmia and I think a loop monitor would be helpful. I do not hear a mitral valve click, but I think it is reasonable to get an echocardiogram and we will see her back in two months. In the meantime if needed we can consider a low dose of Atenolol or Sectral, but my concern would be dropping blood pressure.     EKG- NSR short AL with no slurring pre-excitation  IRBBB and Left axis deviation    Future Appointments Date Time Provider Benjamin Mercado   4/9/2019  9:15 AM Juarez Mccarthy MD [facility] DEL SCHED   4/22/2019  9:00 AM SUKHDEEP ScottSMARI DEL SCHED   6/5/2019  9:40 AM John Montenegro MD [facility] DEL SCHED      Patient Instructions   Dr. Brian Pérez has ordered you a heart monitor called a \"Loop\" monitor. A message has been sent to our EP team (the people who order the monitors). I am currently awaiting their response. Once a monitor becomes available, a company called [employer] will ship the monitor to your porch. Once you receive the monitor, there will be specific instructions on how to apply it, and how it operates. If you have any questions there is a hotline number you can call in the package, someone will walk you through applying the monitor step-by-step if needed. You will wear the monitor for the duration of 30 days and then ship it back to the company. Please allow at least 5-7 business days for return results. If anything emergent is happening on the monitor, Vinayak notifies our office immediately. Dr. Brian Pérez has also ordered an echocardiogram to be done at our office. Key CAD CHF Meds     Patient is on no cardiovascular meds. Impression:   1. Postural dizziness with near syncope    2. Incomplete RBBB    3. Heart palpitations    4. Postural hypotension    5. Stress    6. Dizziness    7. Chest tightness       Cardiac History:   No specialty comments available. ROS-except as noted above. . A complete cardiac and respiratory are reviewed and negative except as above ; Resp-denies wheezing  or productive cough,. Const- No unusual weight loss or fever; Neuro-no recent seizure or CVA ; GI- No BRBPR, abdom pain, bloating ; - no  hematuria   see supplement sheet, initialed and to be scanned by staff  Past Medical History:   Diagnosis Date    Ill-defined condition     anemia      Social Hx= reports that she has never smoked.  She has never used smokeless tobacco. She reports that she does not drink alcohol or use drugs. Exam and Labs:    Visit Vitals  /70 (BP 1 Location: Left arm, BP Patient Position: Sitting)   Pulse 68   Resp 16   Ht 5' 6\" (1.676 m)   Wt 136 lb (61.7 kg)   SpO2 99%   BMI 21.95 kg/m²    @Constitutional:  NAD, comfortable  Head: [place],AT. Eyes: No scleral icterus. Neck:  Neck supple. No JVD present. Throat: moist mucous membranes. Chest: Effort normal & normal respiratory excursion . Neurological: alert, conversant and oriented . Skin: Skin is not cold. No obvious systemic rash noted. Not diaphoretic. No erythema. Psychiatric:  Grossly normal mood and affect. Behavior appears normal. Extremities:  no clubbing or cyanosis. Abdomen: non distended    Lungs:breath sounds normal. No stridor. distress, wheezes or  Rales. Heart:    normal rate, regular rhythm, normal S1, S2, no murmurs, rubs, clicks or gallops , PMI non displaced. Edema: Edema is none.   Lab Results   Component Value Date/Time    Cholesterol, total 168 11/18/2011 08:45 AM    HDL Cholesterol 68 11/18/2011 08:45 AM    LDL, calculated 87 11/18/2011 08:45 AM    Triglyceride 67 11/18/2011 08:45 AM     Lab Results   Component Value Date/Time    Sodium 140 11/07/2017 04:35 PM    Potassium 3.7 11/07/2017 04:35 PM    Chloride 103 11/07/2017 04:35 PM    CO2 28 11/07/2017 04:35 PM    Anion gap 9 11/07/2017 04:35 PM    Glucose 96 11/07/2017 04:35 PM    BUN 13 11/07/2017 04:35 PM    Creatinine 0.78 11/07/2017 04:35 PM    BUN/Creatinine ratio 17 11/07/2017 04:35 PM    GFR est AA >60 11/07/2017 04:35 PM    GFR est non-AA >60 11/07/2017 04:35 PM    Calcium 9.3 11/07/2017 04:35 PM      Wt Readings from Last 3 Encounters:   04/03/19 136 lb (61.7 kg)   03/21/19 136 lb (61.7 kg)   03/12/19 136 lb (61.7 kg)      BP Readings from Last 3 Encounters:   04/03/19 108/70   03/21/19 107/68   03/12/19 113/77      Current Outpatient Medications   Medication Sig    ferrous sulfate (IRON) 325 mg (65 mg iron) EC tablet TAKE ONE TABLET BY MOUTH TWICE DAILY WITH  MEALS    sertraline (ZOLOFT) 25 mg tablet Take 1 Tab by mouth daily.  fluticasone (FLONASE) 50 mcg/actuation nasal spray 2 Sprays by Both Nostrils route daily.  cetirizine (ZYRTEC) 10 mg tablet TAKE ONE TABLET BY MOUTH ONCE DAILY    guaiFENesin ER (MUCINEX) 600 mg ER tablet Take 1 Tab by mouth two (2) times a day.  azelastine (ASTELIN) 137 mcg (0.1 %) nasal spray 1 [place] by Both Nostrils route two (2) times a day. Use in each nostril as directed     No current facility-administered medications for this visit. Impression see above.

## 2019-04-03 NOTE — PATIENT INSTRUCTIONS
Dr. Roosevelt Peter has ordered you a heart monitor called a \"Loop\" monitor. A message has been sent to our EP team (the people who order the monitors). I am currently awaiting their response. Once a monitor becomes available, a company called [employer] will ship the monitor to your porch. Once you receive the monitor, there will be specific instructions on how to apply it, and how it operates. If you have any questions there is a hotline number you can call in the package, someone will walk you through applying the monitor step-by-step if needed. You will wear the monitor for the duration of 30 days and then ship it back to the company. Please allow at least 5-7 business days for return results. If anything emergent is happening on the monitor, Vinayak notifies our office immediately. Dr. Roosevelt Peter has also ordered an echocardiogram to be done at our office.

## 2019-04-09 ENCOUNTER — OFFICE VISIT (OUTPATIENT)
Dept: FAMILY MEDICINE CLINIC | Age: 24
End: 2019-04-09

## 2019-04-09 VITALS
SYSTOLIC BLOOD PRESSURE: 111 MMHG | RESPIRATION RATE: 16 BRPM | WEIGHT: 136 LBS | HEART RATE: 81 BPM | TEMPERATURE: 97.9 F | OXYGEN SATURATION: 98 % | DIASTOLIC BLOOD PRESSURE: 75 MMHG | BODY MASS INDEX: 21.86 KG/M2 | HEIGHT: 66 IN

## 2019-04-09 DIAGNOSIS — N92.0 MENORRHAGIA WITH REGULAR CYCLE: Primary | ICD-10-CM

## 2019-04-09 DIAGNOSIS — F41.9 ANXIETY: ICD-10-CM

## 2019-04-09 DIAGNOSIS — Z87.898 HISTORY OF DIZZINESS: ICD-10-CM

## 2019-04-09 RX ORDER — SERTRALINE HYDROCHLORIDE 25 MG/1
25 TABLET, FILM COATED ORAL DAILY
Qty: 30 TAB | Refills: 0 | Status: SHIPPED | OUTPATIENT
Start: 2019-04-09 | End: 2019-05-03

## 2019-04-09 NOTE — PATIENT INSTRUCTIONS
A Healthy Lifestyle: Care Instructions Your Care Instructions A healthy lifestyle can help you feel good, stay at a healthy weight, and have plenty of energy for both work and play. A healthy lifestyle is something you can share with your whole family. A healthy lifestyle also can lower your risk for serious health problems, such as high blood pressure, heart disease, and diabetes. You can follow a few steps listed below to improve your health and the health of your family. Follow-up care is a key part of your treatment and safety. Be sure to make and go to all appointments, and call your doctor if you are having problems. It's also a good idea to know your test results and keep a list of the medicines you take. How can you care for yourself at home? · Do not eat too much sugar, fat, or fast foods. You can still have dessert and treats now and then. The goal is moderation. · Start small to improve your eating habits. Pay attention to portion sizes, drink less juice and soda pop, and eat more fruits and vegetables. ? Eat a healthy amount of food. A 3-ounce serving of meat, for example, is about the size of a deck of cards. Fill the rest of your plate with vegetables and whole grains. ? Limit the amount of soda and sports drinks you have every day. Drink more water when you are thirsty. ? Eat at least 5 servings of fruits and vegetables every day. It may seem like a lot, but it is not hard to reach this goal. A serving or helping is 1 piece of fruit, 1 cup of vegetables, or 2 cups of leafy, raw vegetables. Have an apple or some carrot sticks as an afternoon snack instead of a candy bar. Try to have fruits and/or vegetables at every meal. 
· Make exercise part of your daily routine. You may want to start with simple activities, such as walking, bicycling, or slow swimming. Try to be active 30 to 60 minutes every day.  You do not need to do all 30 to 60 minutes all at once. For example, you can exercise 3 times a day for 10 or 20 minutes. Moderate exercise is safe for most people, but it is always a good idea to talk to your doctor before starting an exercise program. 
· Keep moving. April Limb the lawn, work in the garden, or [employer]. Take the stairs instead of the elevator at work. · If you smoke, quit. People who smoke have an increased risk for heart attack, stroke, cancer, and other lung illnesses. Quitting is hard, but there are ways to boost your chance of quitting tobacco for good. ? Use nicotine gum, patches, or lozenges. ? Ask your doctor about stop-smoking programs and medicines. ? Keep trying. In addition to reducing your risk of diseases in the future, you will notice some benefits soon after you stop using tobacco. If you have shortness of breath or asthma symptoms, they will likely get better within a few weeks after you quit. · Limit how much alcohol you drink. Moderate amounts of alcohol (up to 2 drinks a day for men, 1 drink a day for women) are okay. But drinking too much can lead to liver problems, high blood pressure, and other health problems. Family health If you have a family, there are many things you can do together to improve your health. · Eat meals together as a family as often as possible. · Eat healthy foods. This includes fruits, vegetables, lean meats and dairy, and whole grains. · Include your family in your fitness plan. Most people think of activities such as jogging or tennis as the way to fitness, but there are many ways you and your family can be more active. Anything that makes you breathe hard and gets your heart pumping is exercise. Here are some tips: 
? Walk to do errands or to take your child to school or the bus. 
? Go for a family bike ride after dinner instead of watching TV. Where can you learn more? Go to http://desmond-nazario.info/. Enter B292 in the search box to learn more about \"A Healthy Lifestyle: Care Instructions. \" Current as of: September 11, 2018 Content Version: 11.9 © 0472-0425 [place], [employer]. Care instructions adapted under license by [employer] (which disclaims liability or warranty for this information). If you have questions about a medical condition or this instruction, always ask your healthcare professional. [place] [address] any warranty or liability for your use of this information.

## 2019-04-09 NOTE — PROGRESS NOTES
1. Have you been to the ER, urgent care clinic since your last visit? Hospitalized since your last visit? no 
 
2. Have you seen or consulted any other health care providers outside of the [address] [place] [address] since your last visit? Include any pap smears or colon screening. no 
Reviewed record in preparation for visit and have obtained necessary documentation. Patient did not bring medications to visit for review. Information provided on Advanced Directive, Living Will. Body mass index is 21.95 kg/m². Health Maintenance Due Topic Date Due  
 HPV Age 9Y-34Y (1 - Female 3-dose series) 07/22/2008  PAP AKA CERVICAL CYTOLOGY  07/22/2014

## 2019-04-09 NOTE — PROGRESS NOTES
[address] [address] Program, [address] [place] [address] Affiliated with [address] [place] [address] Note Subjective:  
Greg Mclean is a 20 y.o. female presents for evaluation of anxiety follow up CC: \" I think I am better History provided by patient HPI: 
 
Anxiety For the past 4 weeks, pt is currently taking Zoloft 25mg daily, she trying to adjust and figure out when to take it and whether she should take in the AM or PM. She notes that she feels tired sometimes and then other times she has a lot energy. Pt complains of mild spontaneous upper extremity tremors and mild dizziness since starting the medication occurring at random times a day on some days. She states her husbands says she is acting more happier and less anxious. However,pt still has episodes of feeling overwhelmed, but not feeling panick. She is thinking about stopping the medication because she feels so much better and feels like she can \"beat her anxiety. \"  Today she states she feels normal and fine . She reports one episode of a heavy period . Last period was 3/23/2019. Current Outpatient Medications on File Prior to Visit Medication Sig Dispense Refill  ferrous sulfate (IRON) 325 mg (65 mg iron) EC tablet TAKE ONE TABLET BY MOUTH TWICE DAILY WITH  MEALS 60 Tab 2  
 sertraline (ZOLOFT) 25 mg tablet Take 1 Tab by mouth daily. 30 Tab 0  
 fluticasone (FLONASE) 50 mcg/actuation nasal spray 2 Sprays by Both Nostrils route daily. 1 Bottle 3  
 cetirizine (ZYRTEC) 10 mg tablet TAKE ONE TABLET BY MOUTH ONCE DAILY 30 Tab 3  
 guaiFENesin ER (MUCINEX) 600 mg ER tablet Take 1 Tab by mouth two (2) times a day.  azelastine (ASTELIN) 137 mcg (0.1 %) nasal spray 1 [place] by Both Nostrils route two (2) times a day. Use in each nostril as directed 1 Bottle 0 No current facility-administered medications on file prior to visit. Past Medical History:  
Diagnosis Date  Ill-defined condition   
 anemia Social History Socioeconomic History  Marital status:  Spouse name: Not on file  Number of children: Not on file  Years of education: Not on file  Highest education level: Not on file Occupational History  Not on file Social Needs  Financial resource strain: Not on file  Food insecurity:  
  Worry: Not on file Inability: Not on file  Transportation needs:  
  Medical: Not on file Non-medical: Not on file Tobacco Use  Smoking status: Never Smoker  Smokeless tobacco: Never Used Substance and Sexual Activity  Alcohol use: No  
 Drug use: No  
 Sexual activity: Yes  
  Partners: Male Birth control/protection: Condom Lifestyle  Physical activity:  
  Days per week: Not on file Minutes per session: Not on file  Stress: Not on file Relationships  Social connections:  
  Talks on phone: Not on file Gets together: Not on file Attends Pentecostalism service: Not on file Active member of club or organization: Not on file Attends meetings of clubs or organizations: Not on file Relationship status: Not on file  Intimate partner violence:  
  Fear of current or ex partner: Not on file Emotionally abused: Not on file Physically abused: Not on file Forced sexual activity: Not on file Other Topics Concern  Not on file Social History Narrative  Not on file Review of Systems Constitutional: Negative for chills and fever. Musculoskeletal: Negative for myalgias. Skin: Negative for itching and rash. Psychiatric/Behavioral: Negative for depression, substance abuse and suicidal ideas. The patient is not nervous/anxious. Objective:  
 
Visit Vitals /75 (BP 1 Location: Right arm, BP Patient Position: Sitting) Pulse 81 Temp 97.9 °F (36.6 °C) (Oral) Ht 5' 6\" (1.676 m) Wt 136 lb (61.7 kg) SpO2 98% BMI 21.95 kg/m² Physical Exam: 
Physical Exam  
 Constitutional: She is oriented to person, place, and time. She appears well-developed and well-nourished. Fidgety Neck: Normal range of motion. Neck supple. Cardiovascular: Normal rate, regular rhythm and normal heart sounds. Pulmonary/Chest: Effort normal and breath sounds normal. No respiratory distress. She has no wheezes. She has no rales. Neurological: She is alert and oriented to person, place, and time. Skin: Skin is warm. Psychiatric: Her mood appears anxious. Her speech is not rapid and/or pressured. She is hyperactive. She is not agitated. She expresses impulsivity. She does not exhibit a depressed mood. She expresses no homicidal and no suicidal ideation. She expresses no suicidal plans and no homicidal plans. Assessment and orders: ICD-10-CM ICD-9-CM 1. Menorrhagia with regular cycle N92.0 626.2 CBC WITH AUTOMATED DIFF  
   METABOLIC PANEL, COMPREHENSIVE  
   LIPID PANEL  
   TSH REFLEX TO T4  
2. Anxiety F41.9 300.00 TSH REFLEX TO T4  
   REFERRAL TO PSYCHIATRY  
   sertraline (ZOLOFT) 25 mg tablet 3. History of dizziness D54.541 B10.81 METABOLIC PANEL, COMPREHENSIVE  
   LIPID PANEL  
   TSH REFLEX TO T4  
 
Diagnoses and all orders for this visit: 
 
1. Menorrhagia with regular cycle -     CBC WITH AUTOMATED DIFF 
-     METABOLIC PANEL, COMPREHENSIVE 
-     LIPID PANEL 
-     TSH REFLEX TO T4 
 
2. Anxiety 
-     TSH REFLEX TO T4 
-     REFERRAL TO PSYCHIATRY 
-     sertraline (ZOLOFT) 25 mg tablet; Take 1 Tab by mouth daily. Indications: anxiety 3. History of dizziness -     METABOLIC PANEL, COMPREHENSIVE 
-     LIPID PANEL 
-     TSH REFLEX TO T4 Follow-up and Dispositions · Return in about 1 month (around 5/7/2019) for medication evaluation. I have reviewed patient medical and social history and medications. I have reviewed pertinent labs results and other data.  I have discussed the diagnosis with the patient and the intended plan as seen in the above orders. The patient has received an after-visit summary and questions were answered concerning future plans. I have discussed medication side effects and warnings with the patient as well. Beverly Mccarty MD 
Resident [facility] 04/09/19

## 2019-04-10 ENCOUNTER — TELEPHONE (OUTPATIENT)
Dept: FAMILY MEDICINE CLINIC | Age: 24
End: 2019-04-10

## 2019-04-10 LAB
ALBUMIN SERPL-MCNC: 4.5 G/DL (ref 3.5–5.5)
ALBUMIN/GLOB SERPL: 2 {RATIO} (ref 1.2–2.2)
ALP SERPL-CCNC: 51 IU/L (ref 39–117)
ALT SERPL-CCNC: 14 IU/L (ref 0–32)
AST SERPL-CCNC: 12 IU/L (ref 0–40)
BASOPHILS # BLD AUTO: 0 X10E3/UL (ref 0–0.2)
BASOPHILS NFR BLD AUTO: 0 %
BILIRUB SERPL-MCNC: 0.4 MG/DL (ref 0–1.2)
BUN SERPL-MCNC: 11 MG/DL (ref 6–20)
BUN/CREAT SERPL: 14 (ref 9–23)
CALCIUM SERPL-MCNC: 9.4 MG/DL (ref 8.7–10.2)
CHLORIDE SERPL-SCNC: 105 MMOL/L (ref 96–106)
CHOLEST SERPL-MCNC: 173 MG/DL (ref 100–199)
CO2 SERPL-SCNC: 24 MMOL/L (ref 20–29)
CREAT SERPL-MCNC: 0.78 MG/DL (ref 0.57–1)
EOSINOPHIL # BLD AUTO: 0.1 X10E3/UL (ref 0–0.4)
EOSINOPHIL NFR BLD AUTO: 1 %
ERYTHROCYTE [DISTWIDTH] IN BLOOD BY AUTOMATED COUNT: 13.6 % (ref 12.3–15.4)
GLOBULIN SER CALC-MCNC: 2.3 G/DL (ref 1.5–4.5)
GLUCOSE SERPL-MCNC: 70 MG/DL (ref 65–99)
HCT VFR BLD AUTO: 44.4 % (ref 34–46.6)
HDLC SERPL-MCNC: 69 MG/DL
HGB BLD-MCNC: 14.9 G/DL (ref 11.1–15.9)
IMM GRANULOCYTES # BLD AUTO: 0 X10E3/UL (ref 0–0.1)
IMM GRANULOCYTES NFR BLD AUTO: 0 %
LDLC SERPL CALC-MCNC: 92 MG/DL (ref 0–99)
LYMPHOCYTES # BLD AUTO: 1.5 X10E3/UL (ref 0.7–3.1)
LYMPHOCYTES NFR BLD AUTO: 29 %
MCH RBC QN AUTO: 30.4 PG (ref 26.6–33)
MCHC RBC AUTO-ENTMCNC: 33.6 G/DL (ref 31.5–35.7)
MCV RBC AUTO: 91 FL (ref 79–97)
MONOCYTES # BLD AUTO: 0.4 X10E3/UL (ref 0.1–0.9)
MONOCYTES NFR BLD AUTO: 9 %
NEUTROPHILS # BLD AUTO: 3.1 X10E3/UL (ref 1.4–7)
NEUTROPHILS NFR BLD AUTO: 61 %
PLATELET # BLD AUTO: 260 X10E3/UL (ref 150–379)
POTASSIUM SERPL-SCNC: 4 MMOL/L (ref 3.5–5.2)
PROT SERPL-MCNC: 6.8 G/DL (ref 6–8.5)
RBC # BLD AUTO: 4.9 X10E6/UL (ref 3.77–5.28)
SODIUM SERPL-SCNC: 142 MMOL/L (ref 134–144)
TRIGL SERPL-MCNC: 59 MG/DL (ref 0–149)
TSH SERPL DL<=0.005 MIU/L-ACNC: 1.29 UIU/ML (ref 0.45–4.5)
VLDLC SERPL CALC-MCNC: 12 MG/DL (ref 5–40)
WBC # BLD AUTO: 5.1 X10E3/UL (ref 3.4–10.8)

## 2019-04-10 NOTE — TELEPHONE ENCOUNTER
Pt states Dr. Smiley Condon told her if she has shaking with Zoloft to let her know so she can begin tapering her off of it. She has them now. The next dosage is tonight. Please call her to tell her how to taper off.  Thanks

## 2019-04-10 NOTE — TELEPHONE ENCOUNTER
Pt will be taking her evening dosage and has not heard how to proceed with weaning off of the medication. Does she cut it in half for tonight. It is 25 mg.

## 2019-04-11 NOTE — TELEPHONE ENCOUNTER
Called patient to advise per Dr. Mahnaz Wynn, she can take 1/2 a tablet Zoloft x 3 days and then stop. No answer, voicemail not set up. Please relay message to patient if she calls back.

## 2019-04-21 PROBLEM — I45.10 INCOMPLETE RBBB: Status: ACTIVE | Noted: 2019-04-21

## 2019-04-21 PROBLEM — R55 POSTURAL DIZZINESS WITH NEAR SYNCOPE: Status: ACTIVE | Noted: 2019-04-21

## 2019-04-21 PROBLEM — R42 POSTURAL DIZZINESS WITH NEAR SYNCOPE: Status: ACTIVE | Noted: 2019-04-21

## 2019-04-21 PROBLEM — F43.9 STRESS: Status: ACTIVE | Noted: 2019-04-21

## 2019-04-24 ENCOUNTER — TELEPHONE (OUTPATIENT)
Dept: CARDIOLOGY CLINIC | Age: 24
End: 2019-04-24

## 2019-04-24 NOTE — TELEPHONE ENCOUNTER
Two patient identifiers verified. Per MD patient called and given results of echo. Patient verbalized understanding and denies any further questions or concerns at this time.

## 2019-04-30 ENCOUNTER — TELEPHONE (OUTPATIENT)
Dept: FAMILY MEDICINE CLINIC | Age: 24
End: 2019-04-30

## 2019-04-30 NOTE — TELEPHONE ENCOUNTER
Gisel Bingham is calling to see what can be done with Pt. She is trying to work and is still having problems. Maybe she needs to see a neurologist or something. Asking if you can please call her. She is really worried.

## 2019-05-01 ENCOUNTER — HOSPITAL ENCOUNTER (EMERGENCY)
Age: 24
Discharge: HOME | End: 2019-05-01
Attending: EMERGENCY MEDICINE
Payer: COMMERCIAL

## 2019-05-01 VITALS
DIASTOLIC BLOOD PRESSURE: 71 MMHG | HEART RATE: 72 BPM | OXYGEN SATURATION: 100 % | WEIGHT: 136 LBS | SYSTOLIC BLOOD PRESSURE: 104 MMHG | RESPIRATION RATE: 17 BRPM | BODY MASS INDEX: 21.95 KG/M2 | TEMPERATURE: 98.1 F

## 2019-05-01 DIAGNOSIS — R42 DIZZINESS: ICD-10-CM

## 2019-05-01 DIAGNOSIS — R07.9 ACUTE CHEST PAIN: Primary | ICD-10-CM

## 2019-05-01 LAB
ATRIAL RATE: 69 BPM
CALCULATED P AXIS, ECG09: 45 DEGREES
CALCULATED R AXIS, ECG10: -50 DEGREES
CALCULATED T AXIS, ECG11: 27 DEGREES
COMMENT, HOLDF: NORMAL
DIAGNOSIS, 93000: NORMAL
P-R INTERVAL, ECG05: 122 MS
Q-T INTERVAL, ECG07: 394 MS
QRS DURATION, ECG06: 102 MS
QTC CALCULATION (BEZET), ECG08: 422 MS
SAMPLES BEING HELD,HOLD: NORMAL
VENTRICULAR RATE, ECG03: 69 BPM

## 2019-05-01 PROCEDURE — 93005 ELECTROCARDIOGRAM TRACING: CPT

## 2019-05-01 PROCEDURE — 99284 EMERGENCY DEPT VISIT MOD MDM: CPT

## 2019-05-01 NOTE — DISCHARGE INSTRUCTIONS
Patient Education        Chest Pain: Care Instructions  Your Care Instructions    There are many things that can cause chest pain. Some are not serious and will get better on their own in a few days. But some kinds of chest pain need more testing and treatment. Your doctor may have recommended a follow-up visit in the next 8 to 12 hours. If you are not getting better, you may need more tests or treatment. Even though your doctor has released you, you still need to watch for any problems. The doctor carefully checked you, but sometimes problems can develop later. If you have new symptoms or if your symptoms do not get better, get medical care right away. If you have worse or different chest pain or pressure that lasts more than 5 minutes or you passed out (lost consciousness), call 911 or seek other emergency help right away. A medical visit is only one step in your treatment. Even if you feel better, you still need to do what your doctor recommends, such as going to all suggested follow-up appointments and taking medicines exactly as directed. This will help you recover and help prevent future problems. How can you care for yourself at home? · Rest until you feel better. · Take your medicine exactly as prescribed. Call your doctor if you think you are having a problem with your medicine. · Do not drive after taking a prescription pain medicine. When should you call for help? Call 911 if:    · You passed out (lost consciousness).     · You have severe difficulty breathing.     · You have symptoms of a heart attack. These may include:  ? Chest pain or pressure, or a strange feeling in your chest.  ? Sweating. ? Shortness of breath. ? Nausea or vomiting. ? Pain, pressure, or a strange feeling in your back, neck, jaw, or upper belly or in one or both shoulders or arms. ? Lightheadedness or sudden weakness. ? A fast or irregular heartbeat.   After you call 911, the  may tell you to chew 1 adult-strength or 2 to 4 low-dose aspirin. Wait for an ambulance. Do not try to drive yourself.    Call your doctor today if:    · You have any trouble breathing.     · Your chest pain gets worse.     · You are dizzy or lightheaded, or you feel like you may faint.     · You are not getting better as expected.     · You are having new or different chest pain. Where can you learn more? Go to http://desmond-nazario.info/. Enter A120 in the search box to learn more about \"Chest Pain: Care Instructions. \"  Current as of: September 23, 2018  Content Version: 11.9  © 3842-8987 [employer]. Care instructions adapted under license by [employer] (which disclaims liability or warranty for this information). If you have questions about a medical condition or this instruction, always ask your healthcare professional. [place] [address] any warranty or liability for your use of this information. Patient Education        Dizziness: Care Instructions  Your Care Instructions  Dizziness is the feeling of unsteadiness or fuzziness in your head. It is different than having vertigo, which is a feeling that the room is spinning or that you are moving or falling. It is also different from lightheadedness, which is the feeling that you are about to faint. It can be hard to know what causes dizziness. Some people feel dizzy when they have migraine headaches. Sometimes bouts of flu can make you feel dizzy. Some medical conditions, such as heart problems or high blood pressure, can make you feel dizzy. Many medicines can cause dizziness, including medicines for high blood pressure, pain, or anxiety. If a medicine causes your symptoms, your doctor may recommend that you stop or change the medicine. If it is a problem with your heart, you may need medicine to help your heart work better.  If there is no clear reason for your symptoms, your doctor may suggest watching and waiting for a while to see if the dizziness goes away on its own. Follow-up care is a key part of your treatment and safety. Be sure to make and go to all appointments, and call your doctor if you are having problems. It's also a good idea to know your test results and keep a list of the medicines you take. How can you care for yourself at home? · If your doctor recommends or prescribes medicine, take it exactly as directed. Call your doctor if you think you are having a problem with your medicine. · Do not drive while you feel dizzy. · Try to prevent falls. Steps you can take include:  ? Using nonskid mats, adding grab bars near the tub, and using night-lights. ? Clearing your home so that walkways are free of anything you might trip on.  ? Letting family and friends know that you have been feeling dizzy. This will help them know how to help you. When should you call for help? Call 911 anytime you think you may need emergency care. For example, call if:    · You passed out (lost consciousness).     · You have dizziness along with symptoms of a heart attack. These may include:  ? Chest pain or pressure, or a strange feeling in the chest.  ? Sweating. ? Shortness of breath. ? Nausea or vomiting. ? Pain, pressure, or a strange feeling in the back, neck, jaw, or upper belly or in one or both shoulders or arms. ? Lightheadedness or sudden weakness. ? A fast or irregular heartbeat.     · You have symptoms of a stroke. These may include:  ? Sudden numbness, tingling, weakness, or loss of movement in your face, arm, or leg, especially on only one side of your body. ? Sudden vision changes. ? Sudden trouble speaking. ? Sudden confusion or trouble understanding simple statements. ? Sudden problems with walking or balance.   ? A sudden, severe headache that is different from past headaches.    Call your doctor now or seek immediate medical care if:    · You feel dizzy and have a fever, headache, or ringing in your ears.     · You have new or increased nausea and vomiting.     · Your dizziness does not go away or comes back.    Watch closely for changes in your health, and be sure to contact your doctor if:    · You do not get better as expected. Where can you learn more? Go to http://desmond-nazario.info/. Enter P021 in the search box to learn more about \"Dizziness: Care Instructions. \"  Current as of: September 23, 2018  Content Version: 11.9  © 0858-8174 [employer]. Care instructions adapted under license by [employer] (which disclaims liability or warranty for this information). If you have questions about a medical condition or this instruction, always ask your healthcare professional. [address] any warranty or liability for your use of this information.
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
## 2019-05-01 NOTE — ED NOTES
The patient and family were given complete discharge instructions by Dr. Mike Hodges and discharged home.

## 2019-05-01 NOTE — LETTER
[address] 
[facility] EMERGENCY DEPT 
[address] [facility] [place] [facility] [address] [id number] [phone] Work/School Note Date: 5/1/2019 To Whom It May concern: 
 
Baudilio Cuello was seen and treated today in the emergency room by the following provider(s): 
Attending Provider: Max Ruiz MD. Baudilio Cuello Special Instructions:  Please excuse from work today. Sincerely, Gris Izquierdo MD

## 2019-05-01 NOTE — TELEPHONE ENCOUNTER
Patient's Mom, Axel Braga, called back. Patient has been to the ER this am and she would like to discuss the problems the patient has been having. Please call back at [phone].
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
## 2019-05-03 ENCOUNTER — OFFICE VISIT (OUTPATIENT)
Dept: FAMILY MEDICINE CLINIC | Age: 24
End: 2019-05-03

## 2019-05-03 VITALS
HEIGHT: 66 IN | TEMPERATURE: 98.3 F | DIASTOLIC BLOOD PRESSURE: 77 MMHG | BODY MASS INDEX: 22.47 KG/M2 | HEART RATE: 71 BPM | RESPIRATION RATE: 20 BRPM | SYSTOLIC BLOOD PRESSURE: 116 MMHG | OXYGEN SATURATION: 100 % | WEIGHT: 139.8 LBS

## 2019-05-03 DIAGNOSIS — R25.1 EPISODE OF SHAKING: ICD-10-CM

## 2019-05-03 DIAGNOSIS — R42 EPISODE OF DIZZINESS: Primary | ICD-10-CM

## 2019-05-03 DIAGNOSIS — F41.9 ANXIETY: ICD-10-CM

## 2019-05-03 RX ORDER — HYDROXYZINE 25 MG/1
12.5 TABLET, FILM COATED ORAL
Qty: 30 TAB | Refills: 0 | Status: SHIPPED | OUTPATIENT
Start: 2019-05-03 | End: 2019-05-13

## 2019-05-03 RX ORDER — BUPROPION HYDROCHLORIDE 150 MG/1
150 TABLET ORAL
Qty: 30 TAB | Refills: 0 | Status: SHIPPED | OUTPATIENT
Start: 2019-05-03 | End: 2020-11-05

## 2019-05-03 NOTE — PROGRESS NOTES
Neurology  Progress Note    Patient: Jose Sorenson MRN: [id number]  SSN: [id number]    YOB: 1995  Age: 20 y.o. Sex: female        Chief Complaint   Patient presents with    Referral Follow Up     she is a 20y.o. year old female who presents with mother requesting referral. Patient seen in ED for chest tightness, shakiness and dizziness. ED work up unremarkable. She has been having similar episodes recently. More so since starting new job. She has has seen cardiology with ECHO and is awaiting results from loop monitor. She is asking for neurology evaluation. Symptoms consistent with anxiety. However, patient does not appear to be ready to accept this diagnosis. She was unable to tolerate SSRI. Encounter Diagnoses   Name Primary?     Episode of dizziness Yes    Episode of shaking     Anxiety        Patient Active Problem List   Diagnosis Code    Allergic rhinitis J30.9    ADHD (attention deficit hyperactivity disorder) F90.9    Dermatitis, atopic L20.9    Allergic dermatitis due ingested food L27.2    Abnormal EKG R94.31    Postural dizziness with near syncope R42, R55    Stress F43.9    Incomplete RBBB I45.10     Past Surgical History:   Procedure Laterality Date    HX HEENT      teeth     Social History     Socioeconomic History    Marital status:      Spouse name: Not on file    Number of children: Not on file    Years of education: Not on file    Highest education level: Not on file   Occupational History    Not on file   Social Needs    Financial resource strain: Not on file    Food insecurity:     Worry: Not on file     Inability: Not on file    Transportation needs:     Medical: Not on file     Non-medical: Not on file   Tobacco Use    Smoking status: Never Smoker    Smokeless tobacco: Never Used   Substance and Sexual Activity    Alcohol use: No    Drug use: No    Sexual activity: Yes     Partners: Male     Birth control/protection: Condom   Lifestyle    Physical activity:     Days per week: Not on file     Minutes per session: Not on file    Stress: Not on file   Relationships    Social connections:     Talks on phone: Not on file     Gets together: Not on file     Attends Anabaptist service: Not on file     Active member of club or organization: Not on file     Attends meetings of clubs or organizations: Not on file     Relationship status: Not on file    Intimate partner violence:     Fear of current or ex partner: Not on file     Emotionally abused: Not on file     Physically abused: Not on file     Forced sexual activity: Not on file   Other Topics Concern    Not on file   Social History Narrative    Not on file     No family history on file. Current Outpatient Medications   Medication Sig    hydrOXYzine HCl (ATARAX) 25 mg tablet Take 0.5 Tabs by mouth three (3) times daily as needed for Anxiety for up to 10 days.  buPROPion XL (WELLBUTRIN XL) 150 mg tablet Take 1 Tab by mouth every morning.  ferrous sulfate (IRON) 325 mg (65 mg iron) EC tablet TAKE ONE TABLET BY MOUTH TWICE DAILY WITH  MEALS    fluticasone (FLONASE) 50 mcg/actuation nasal spray 2 Sprays by Both Nostrils route daily.  cetirizine (ZYRTEC) 10 mg tablet TAKE ONE TABLET BY MOUTH ONCE DAILY    guaiFENesin ER (MUCINEX) 600 mg ER tablet Take 1 Tab by mouth two (2) times a day. No current facility-administered medications for this visit.       Allergies   Allergen Reactions    Ceclor [Cefaclor] Other (comments)     uticaria    Cephalosporins Other (comments)     swollen joints (as a baby)       Review of Systems:  Constitutional: Negative for fatigue, malaise  Resp: Negative for cough, wheezing or SOB  CV: Negative for chest pain, dizziness or palpitations  GI: Negative for nausea or abdominal pain  MS: Negative for acute myalgias or arthralgias   Neuro: Negative for HA, weakness or paresthesia  Psych: see HPI    Vitals:    05/03/19 1535   BP: 116/77   Pulse: 71   Resp: 20   Temp: 98.3 °F (36.8 °C)   TempSrc: Oral   SpO2: 100%   Weight: 139 lb 12.8 oz (63.4 kg)   Height: 5' 6\" (1.676 m)       Physical Examination:  General: Well developed, well nourished, in no acute distress  Head: Normocephalic, atraumatic  Eyes: Sclera clear, EOMI  Neck: Normal range of motion  Respiratory: symmetrical, unlabored effort  Cardiovascular: Regular rate and rhythm  Extremities: Full range of motion, normal gait  Neurologic: No focal deficits  Psych: Active, alert and oriented. Anxious affect      ICD-10-CM ICD-9-CM    1. Episode of dizziness R42 780.4 REFERRAL TO NEUROLOGY   2. Episode of shaking R25.1 781.0 REFERRAL TO NEUROLOGY   3. Anxiety F41.9 300.00      Plan of care:  Diagnoses were discussed in detail with patient. Medication risks/benefits/side effects discussed with patient. All of the patient's questions were addressed and answered to apparent satisfaction. The patient understands and agrees with our plan of care. The patient knows to call back if they have questions about the plan of care or if symptoms change. The patient received an After-Visit Summary which contains VS, diagnoses, orders, allergy and medication lists. Over half of the 25 minutes face to face with Sherren Slight consisted of counseling and discussing treatment plans in reference to her dizziness, shakiness and anxiety.     Future Appointments   Date Time Provider Benjamin Mercado   6/5/2019  9:40 AM Katie Goodwin MD [address] [place] [address]

## 2019-05-13 NOTE — PATIENT INSTRUCTIONS
A Healthy Lifestyle: Care Instructions  Your Care Instructions    A healthy lifestyle can help you feel good, stay at a healthy weight, and have plenty of energy for both work and play. A healthy lifestyle is something you can share with your whole family. A healthy lifestyle also can lower your risk for serious health problems, such as high blood pressure, heart disease, and diabetes. You can follow a few steps listed below to improve your health and the health of your family. Follow-up care is a key part of your treatment and safety. Be sure to make and go to all appointments, and call your doctor if you are having problems. It's also a good idea to know your test results and keep a list of the medicines you take. How can you care for yourself at home? · Do not eat too much sugar, fat, or fast foods. You can still have dessert and treats now and then. The goal is moderation. · Start small to improve your eating habits. Pay attention to portion sizes, drink less juice and soda pop, and eat more fruits and vegetables. ? Eat a healthy amount of food. A 3-ounce serving of meat, for example, is about the size of a deck of cards. Fill the rest of your plate with vegetables and whole grains. ? Limit the amount of soda and sports drinks you have every day. Drink more water when you are thirsty. ? Eat at least 5 servings of fruits and vegetables every day. It may seem like a lot, but it is not hard to reach this goal. A serving or helping is 1 piece of fruit, 1 cup of vegetables, or 2 cups of leafy, raw vegetables. Have an apple or some carrot sticks as an afternoon snack instead of a candy bar. Try to have fruits and/or vegetables at every meal.  · Make exercise part of your daily routine. You may want to start with simple activities, such as walking, bicycling, or slow swimming. Try to be active 30 to 60 minutes every day. You do not need to do all 30 to 60 minutes all at once.  For example, you can exercise 3 times a day for 10 or 20 minutes. Moderate exercise is safe for most people, but it is always a good idea to talk to your doctor before starting an exercise program.  · Keep moving. [place] the lawn, work in the garden, or [employer]. Take the stairs instead of the elevator at work. · If you smoke, quit. People who smoke have an increased risk for heart attack, stroke, cancer, and other lung illnesses. Quitting is hard, but there are ways to boost your chance of quitting tobacco for good. ? Use nicotine gum, patches, or lozenges. ? Ask your doctor about stop-smoking programs and medicines. ? Keep trying. In addition to reducing your risk of diseases in the future, you will notice some benefits soon after you stop using tobacco. If you have shortness of breath or asthma symptoms, they will likely get better within a few weeks after you quit. · Limit how much alcohol you drink. Moderate amounts of alcohol (up to 2 drinks a day for men, 1 drink a day for women) are okay. But drinking too much can lead to liver problems, high blood pressure, and other health problems. Family health  If you have a family, there are many things you can do together to improve your health. · Eat meals together as a family as often as possible. · Eat healthy foods. This includes fruits, vegetables, lean meats and dairy, and whole grains. · Include your family in your fitness plan. Most people think of activities such as jogging or tennis as the way to fitness, but there are many ways you and your family can be more active. Anything that makes you breathe hard and gets your heart pumping is exercise. Here are some tips:  ? Walk to do errands or to take your child to school or the bus.  ? Go for a family bike ride after dinner instead of watching TV. Where can you learn more? Go to http://desmond-nazario.info/. Enter C575 in the search box to learn more about \"A Healthy Lifestyle: Care Instructions. \"  Current as of: September 11, 2018  Content Version: 11.9  © 0888-1358 [employer]. Care instructions adapted under license by [employer] (which disclaims liability or warranty for this information). If you have questions about a medical condition or this instruction, always ask your healthcare professional. [address] any warranty or liability for your use of this information.

## 2019-05-17 ENCOUNTER — TELEPHONE (OUTPATIENT)
Dept: CARDIOLOGY CLINIC | Age: 24
End: 2019-05-17

## 2019-05-23 NOTE — TELEPHONE ENCOUNTER
Future Appointments   Date Time Provider Benjamin Mercado   6/5/2019  8:00 AM Gaby Watson MD [address]   6/5/2019  9:40 AM Suyapa Kerr MD [facility] DEL SCHED      Loop April 2019 no significant arrhythmia  Echo was normal  How is she feeling?   We can discuss options for meds when she returns in meantime continue to use salty morning foods, gatorade once a day and hydrate

## 2019-12-04 DIAGNOSIS — E61.1 IRON DEFICIENCY: ICD-10-CM

## 2019-12-04 RX ORDER — FERROUS SULFATE 325(65) MG
325 TABLET, DELAYED RELEASE (ENTERIC COATED) ORAL
Qty: 30 TAB | Refills: 0 | Status: SHIPPED | OUTPATIENT
Start: 2019-12-04 | End: 2020-01-20

## 2019-12-04 NOTE — LETTER
12/4/2019 1:02 PM 
 
Ms. Swetha Rdz [facility] Ernie An [address] [place] [phone] Dear Ms. Johnson: 
 
We've missed you! Please call our office at [phone] and schedule a follow up appointment for your continued care. -Medication Refills Sincerely, MARTINA CORNEJO & ARCHIE PACHECO [facility] [place]

## 2019-12-05 RX ORDER — FERROUS SULFATE 325(65) MG
TABLET, DELAYED RELEASE (ENTERIC COATED) ORAL
Qty: 60 TAB | Refills: 2 | OUTPATIENT
Start: 2019-12-05

## 2020-03-02 ENCOUNTER — PATIENT MESSAGE (OUTPATIENT)
Dept: FAMILY MEDICINE CLINIC | Age: 25
End: 2020-03-02

## 2020-03-02 DIAGNOSIS — E61.1 IRON DEFICIENCY: ICD-10-CM

## 2020-03-02 RX ORDER — FERROUS SULFATE 325(65) MG
325 TABLET, DELAYED RELEASE (ENTERIC COATED) ORAL
Qty: 90 TAB | Refills: 0 | Status: CANCELLED | OUTPATIENT
Start: 2020-03-02

## 2020-03-02 RX ORDER — FERROUS SULFATE 325(65) MG
TABLET, DELAYED RELEASE (ENTERIC COATED) ORAL
Qty: 60 TAB | Refills: 0 | OUTPATIENT
Start: 2020-03-02

## 2020-03-03 DIAGNOSIS — E61.1 IRON DEFICIENCY: ICD-10-CM

## 2020-03-03 RX ORDER — FERROUS SULFATE 325(65) MG
325 TABLET, DELAYED RELEASE (ENTERIC COATED) ORAL
Qty: 90 TAB | Refills: 0 | Status: CANCELLED | OUTPATIENT
Start: 2020-03-03

## 2020-03-03 RX ORDER — FERROUS SULFATE 325(65) MG
325 TABLET, DELAYED RELEASE (ENTERIC COATED) ORAL
Qty: 90 TAB | Refills: 0 | OUTPATIENT
Start: 2020-03-03

## 2020-03-03 NOTE — PROGRESS NOTES
Order placed for PPD, 0.1ml, intradermal, per Verbal Order from Dr. Karen Sung on 12/3/2018 for employer PPD screening        PPD Placement note  Yuni Barger, 20 y.o. female is here today for placement of PPD test  Reason for PPD test: Employer  Pt taken PPD test before: yes  Verified in allergy area and with patient that they are not allergic to the products PPD is made of (Phenol or Tween). Yes  Is patient taking any oral or IV steroid medication now or have they taken it in the last month? yes  Has the patient ever received the BCG vaccine?: no  Has the patient been in recent contact with anyone known or suspected of having active TB disease?: no       Date of exposure (if applicable): n/a       Name of person they were exposed to (if applicable): n/a  Patient's Country of origin?: [facility] [place]  O: Alert and oriented in NAD. P:  PPD placed on 12/3/2018. Patient advised to return for reading within 48-72 hours. absent

## 2020-07-10 ENCOUNTER — TELEPHONE (OUTPATIENT)
Dept: CARDIOLOGY CLINIC | Age: 25
End: 2020-07-10

## 2020-07-10 NOTE — TELEPHONE ENCOUNTER
Faxed office note from 4/3/19 to Jorge comer [facility] per their request for monitor ordered on 4/9/2019

## 2020-11-05 ENCOUNTER — VIRTUAL VISIT (OUTPATIENT)
Dept: FAMILY MEDICINE CLINIC | Age: 25
End: 2020-11-05
Payer: COMMERCIAL

## 2020-11-05 DIAGNOSIS — E61.1 IRON DEFICIENCY: ICD-10-CM

## 2020-11-05 DIAGNOSIS — F41.9 ANXIETY: Primary | ICD-10-CM

## 2020-11-05 DIAGNOSIS — J30.1 SEASONAL ALLERGIC RHINITIS DUE TO POLLEN: ICD-10-CM

## 2020-11-05 PROCEDURE — 99214 OFFICE O/P EST MOD 30 MIN: CPT | Performed by: FAMILY MEDICINE

## 2020-11-05 RX ORDER — FLUTICASONE PROPIONATE 50 MCG
2 SPRAY, SUSPENSION (ML) NASAL DAILY
Qty: 1 BOTTLE | Refills: 3 | Status: SHIPPED | OUTPATIENT
Start: 2020-11-05

## 2020-11-05 RX ORDER — FERROUS SULFATE 325(65) MG
325 TABLET, DELAYED RELEASE (ENTERIC COATED) ORAL
Qty: 90 TAB | Refills: 1 | Status: SHIPPED | OUTPATIENT
Start: 2020-11-05 | End: 2021-10-28 | Stop reason: SDUPTHER

## 2020-11-05 RX ORDER — CETIRIZINE HCL 10 MG
TABLET ORAL
Qty: 30 TAB | Refills: 3 | Status: SHIPPED | OUTPATIENT
Start: 2020-11-05

## 2020-11-05 NOTE — PROGRESS NOTES
Jana Brady is a 30 y.o. female evaluated via computer/telephone on 20. Patient Identity confirmed by . Video encounter done in lieu of office visit due to extraordinary circumstances. A state of national and state emergency has been declared by the President and the [place] due to the [employer] pandemic. Pursuant to this emergency declaration under the [facility] [place] [address] [facility] authority and the [employer] and [employer] Act, this Virtual  Visit was conducted, with patient's consent, to reduce the patient's risk of exposure to COVID-19 and provide continuity of care for an established patient. Jackelyn Dhillon LPN coordinated virtual visit    Consent:  Patient and/or health care decision maker is aware that he/she may receive a bill for this video service, depending on his insurance coverage, and has provided verbal consent to proceed: Yes    Physician Location: Office  Patient Location: Home    CC:   Information gathered from patient and/or health care decision maker. HPI: Jana Brady is a 30 y.o. female who was evaluated by synchronous (real-time) audio-video technology from his/her home, through the [employer] Patient Portal. Patient with hx of anxiety which has negatively affected her ability to work. She says she is now ready for counseling to help her with her anxiety. She needs medication refills for her allergic rhinitis and low iron. Patient  sans other complaints or concerns at this time. Encounter Diagnoses   Name Primary?  Anxiety Yes    Seasonal allergic rhinitis due to pollen     Iron deficiency          Current Outpatient Medications:     cetirizine (ZYRTEC) 10 mg tablet, TAKE ONE TABLET BY MOUTH ONCE DAILY, Disp: 30 Tab, Rfl: 3    ferrous sulfate (IRON) 325 mg (65 mg iron) EC tablet, Take 1 Tab by mouth Daily (before breakfast). , Disp: 90 Tab, Rfl: 1    fluticasone propionate (FLONASE) 50 mcg/actuation nasal spray, 2 Sprays by Both Nostrils route daily. , Disp: 1 Bottle, Rfl: 3    guaiFENesin ER (MUCINEX) 600 mg ER tablet, Take 1 Tab by mouth two (2) times a day., Disp: , Rfl:      Allergies   Allergen Reactions    Ceclor [Cefaclor] Other (comments)     uticaria    Cephalosporins Other (comments)     swollen joints (as a baby)        Patient Active Problem List    Diagnosis Date Noted    Postural dizziness with near syncope 04/21/2019    Stress 04/21/2019    Incomplete RBBB 04/21/2019    Abnormal EKG 11/21/2017    Allergic dermatitis due ingested food 02/27/2015    Allergic rhinitis 05/05/2010    ADHD (attention deficit hyperactivity disorder) 05/05/2010    Dermatitis, atopic 05/05/2010        Review of Systems:  Constitutional: Negative for fatigue, malaise  Resp: Negative for cough, wheezing or SOB  CV: Negative for chest pain, dizziness or palpitations  GI: Negative for nausea or abdominal pain  MS: Negative for acute myalgias or arthralgias   Neuro: Negative for HA, weakness or paresthesia  Psych: see HPI         Physical Examination:  General: Appears in no acute distress  Head: Normocephalic, atraumatic  Eyes: Sclera clear, EOMI  Neck: Normal range of motion  Respiratory: Symmetrical, unlabored effort  Psych: Active, alert and oriented. Affect appropriate     Assessment/Plan:   Details of this discussion including any medical advice provided: Patient advised as a precaution to stay at home, practice regular hand washing with soap and warm water and to wear a mask and utilize social distancing when necessary to be out in public places. ICD-10-CM ICD-9-CM    1. Anxiety  F41.9 300.00    2. Seasonal allergic rhinitis due to pollen  J30.1 477.0 cetirizine (ZYRTEC) 10 mg tablet      fluticasone propionate (FLONASE) 50 mcg/actuation nasal spray   3.  Iron deficiency  E61.1 280.9 ferrous sulfate (IRON) 325 mg (65 mg iron) EC tablet       Symptomatic therapy suggested: call prn if symptoms persist or worsen. Total Time: minutes: 21-30 minutes was spent on video encounter discussing above problems and plan. Patient medical history, prior OV notes, vitals flow sheet, lab results, medications, and allergies were reviewed during this encounter.         MD MARTINA Phelan & ARCHIE PACHECO [facility]  11/09/20

## 2020-11-05 NOTE — PROGRESS NOTES
Chief Complaint   Patient presents with    Medication Evaluation     1. Have you been to the ER, urgent care clinic since your last visit? Hospitalized since your last visit? No    2. Have you seen or consulted any other health care providers outside of the [address] [place] [address] since your last visit? Include any pap smears or colon screening.  No    Reviewed record in preparation for visit and have necessary documentation  Pt did not bring medication to office visit for review  Information was given to pt on Advanced Directives, Living Will  Information was given on Shingles Vaccine  Opportunity was given for questions  Goals that were addressed and/or need to be completed after this appointment include:     Health Maintenance Due   Topic Date Due    PAP AKA CERVICAL CYTOLOGY  07/22/2014    Flu Vaccine (1) 09/01/2020
